# Patient Record
Sex: FEMALE | Race: WHITE | NOT HISPANIC OR LATINO
[De-identification: names, ages, dates, MRNs, and addresses within clinical notes are randomized per-mention and may not be internally consistent; named-entity substitution may affect disease eponyms.]

---

## 2018-02-12 ENCOUNTER — TRANSCRIPTION ENCOUNTER (OUTPATIENT)
Age: 38
End: 2018-02-12

## 2018-06-06 ENCOUNTER — TRANSCRIPTION ENCOUNTER (OUTPATIENT)
Age: 38
End: 2018-06-06

## 2018-06-11 ENCOUNTER — TRANSCRIPTION ENCOUNTER (OUTPATIENT)
Age: 38
End: 2018-06-11

## 2018-11-09 ENCOUNTER — TRANSCRIPTION ENCOUNTER (OUTPATIENT)
Age: 38
End: 2018-11-09

## 2019-03-24 ENCOUNTER — TRANSCRIPTION ENCOUNTER (OUTPATIENT)
Age: 39
End: 2019-03-24

## 2020-02-07 ENCOUNTER — TRANSCRIPTION ENCOUNTER (OUTPATIENT)
Age: 40
End: 2020-02-07

## 2020-12-15 ENCOUNTER — TRANSCRIPTION ENCOUNTER (OUTPATIENT)
Age: 40
End: 2020-12-15

## 2020-12-17 ENCOUNTER — TRANSCRIPTION ENCOUNTER (OUTPATIENT)
Age: 40
End: 2020-12-17

## 2020-12-17 ENCOUNTER — INPATIENT (INPATIENT)
Facility: HOSPITAL | Age: 40
LOS: 0 days | Discharge: HOME | DRG: 811 | End: 2020-12-18
Attending: HOSPITALIST | Admitting: HOSPITALIST
Payer: COMMERCIAL

## 2020-12-17 VITALS
WEIGHT: 113.1 LBS | OXYGEN SATURATION: 97 % | HEART RATE: 112 BPM | HEIGHT: 63 IN | RESPIRATION RATE: 20 BRPM | SYSTOLIC BLOOD PRESSURE: 128 MMHG | DIASTOLIC BLOOD PRESSURE: 55 MMHG | TEMPERATURE: 98 F

## 2020-12-17 DIAGNOSIS — Z90.49 ACQUIRED ABSENCE OF OTHER SPECIFIED PARTS OF DIGESTIVE TRACT: Chronic | ICD-10-CM

## 2020-12-17 DIAGNOSIS — Z98.89 OTHER SPECIFIED POSTPROCEDURAL STATES: Chronic | ICD-10-CM

## 2020-12-17 DIAGNOSIS — D57.00 HB-SS DISEASE WITH CRISIS, UNSPECIFIED: ICD-10-CM

## 2020-12-17 LAB
ALBUMIN SERPL ELPH-MCNC: 4.1 G/DL — SIGNIFICANT CHANGE UP (ref 3.3–5.2)
ALP SERPL-CCNC: 49 U/L — SIGNIFICANT CHANGE UP (ref 40–120)
ALT FLD-CCNC: 19 U/L — SIGNIFICANT CHANGE UP
ANION GAP SERPL CALC-SCNC: 14 MMOL/L — SIGNIFICANT CHANGE UP (ref 5–17)
ANISOCYTOSIS BLD QL: SLIGHT — SIGNIFICANT CHANGE UP
APTT BLD: 27.6 SEC — SIGNIFICANT CHANGE UP (ref 27.5–35.5)
AST SERPL-CCNC: 75 U/L — HIGH
B PERT DNA SPEC QL NAA+PROBE: SIGNIFICANT CHANGE UP
BASOPHILS # BLD AUTO: 0 K/UL — SIGNIFICANT CHANGE UP (ref 0–0.2)
BASOPHILS NFR BLD AUTO: 0 % — SIGNIFICANT CHANGE UP (ref 0–2)
BILIRUB SERPL-MCNC: 2.3 MG/DL — HIGH (ref 0.4–2)
BLD GP AB SCN SERPL QL: SIGNIFICANT CHANGE UP
BUN SERPL-MCNC: 7 MG/DL — LOW (ref 8–20)
BURR CELLS BLD QL SMEAR: SLIGHT — SIGNIFICANT CHANGE UP
C PNEUM DNA SPEC QL NAA+PROBE: SIGNIFICANT CHANGE UP
CALCIUM SERPL-MCNC: 8.7 MG/DL — SIGNIFICANT CHANGE UP (ref 8.6–10.2)
CHLORIDE SERPL-SCNC: 103 MMOL/L — SIGNIFICANT CHANGE UP (ref 98–107)
CO2 SERPL-SCNC: 21 MMOL/L — LOW (ref 22–29)
CREAT SERPL-MCNC: 0.24 MG/DL — LOW (ref 0.5–1.3)
DACRYOCYTES BLD QL SMEAR: SLIGHT — SIGNIFICANT CHANGE UP
ELLIPTOCYTES BLD QL SMEAR: SLIGHT — SIGNIFICANT CHANGE UP
EOSINOPHIL # BLD AUTO: 0 K/UL — SIGNIFICANT CHANGE UP (ref 0–0.5)
EOSINOPHIL NFR BLD AUTO: 0 % — SIGNIFICANT CHANGE UP (ref 0–6)
FLUAV H1 2009 PAND RNA SPEC QL NAA+PROBE: SIGNIFICANT CHANGE UP
FLUAV H1 RNA SPEC QL NAA+PROBE: SIGNIFICANT CHANGE UP
FLUAV H3 RNA SPEC QL NAA+PROBE: SIGNIFICANT CHANGE UP
FLUAV SUBTYP SPEC NAA+PROBE: SIGNIFICANT CHANGE UP
FLUBV RNA SPEC QL NAA+PROBE: SIGNIFICANT CHANGE UP
GIANT PLATELETS BLD QL SMEAR: PRESENT — SIGNIFICANT CHANGE UP
GLUCOSE SERPL-MCNC: 99 MG/DL — SIGNIFICANT CHANGE UP (ref 70–99)
HADV DNA SPEC QL NAA+PROBE: SIGNIFICANT CHANGE UP
HCG SERPL-ACNC: <4 MIU/ML — SIGNIFICANT CHANGE UP
HCOV PNL SPEC NAA+PROBE: SIGNIFICANT CHANGE UP
HCT VFR BLD CALC: 16.7 % — CRITICAL LOW (ref 34.5–45)
HGB BLD-MCNC: 5.4 G/DL — CRITICAL LOW (ref 11.5–15.5)
HMPV RNA SPEC QL NAA+PROBE: SIGNIFICANT CHANGE UP
HPIV1 RNA SPEC QL NAA+PROBE: SIGNIFICANT CHANGE UP
HPIV2 RNA SPEC QL NAA+PROBE: SIGNIFICANT CHANGE UP
HPIV3 RNA SPEC QL NAA+PROBE: SIGNIFICANT CHANGE UP
HPIV4 RNA SPEC QL NAA+PROBE: SIGNIFICANT CHANGE UP
HYPOCHROMIA BLD QL: SLIGHT — SIGNIFICANT CHANGE UP
INR BLD: 1.22 RATIO — HIGH (ref 0.88–1.16)
LYMPHOCYTES # BLD AUTO: 0.2 K/UL — LOW (ref 1–3.3)
LYMPHOCYTES # BLD AUTO: 4.4 % — LOW (ref 13–44)
MACROCYTES BLD QL: SLIGHT — SIGNIFICANT CHANGE UP
MANUAL SMEAR VERIFICATION: SIGNIFICANT CHANGE UP
MCHC RBC-ENTMCNC: 23.5 PG — LOW (ref 27–34)
MCHC RBC-ENTMCNC: 32.3 GM/DL — SIGNIFICANT CHANGE UP (ref 32–36)
MCV RBC AUTO: 72.6 FL — LOW (ref 80–100)
METAMYELOCYTES # FLD: 0.9 % — HIGH (ref 0–0)
MICROCYTES BLD QL: SLIGHT — SIGNIFICANT CHANGE UP
MONOCYTES # BLD AUTO: 0.43 K/UL — SIGNIFICANT CHANGE UP (ref 0–0.9)
MONOCYTES NFR BLD AUTO: 9.6 % — SIGNIFICANT CHANGE UP (ref 2–14)
MYELOCYTES NFR BLD: 0.9 % — HIGH (ref 0–0)
NEUTROPHILS # BLD AUTO: 3.74 K/UL — SIGNIFICANT CHANGE UP (ref 1.8–7.4)
NEUTROPHILS NFR BLD AUTO: 84.2 % — HIGH (ref 43–77)
NRBC # BLD: 2 /100 — HIGH (ref 0–0)
OVALOCYTES BLD QL SMEAR: SLIGHT — SIGNIFICANT CHANGE UP
PLAT MORPH BLD: NORMAL — SIGNIFICANT CHANGE UP
PLATELET # BLD AUTO: 57 K/UL — LOW (ref 150–400)
PLATELET COUNT - ESTIMATE: ABNORMAL
POIKILOCYTOSIS BLD QL AUTO: SLIGHT — SIGNIFICANT CHANGE UP
POLYCHROMASIA BLD QL SMEAR: SLIGHT — SIGNIFICANT CHANGE UP
POTASSIUM SERPL-MCNC: 4.1 MMOL/L — SIGNIFICANT CHANGE UP (ref 3.5–5.3)
POTASSIUM SERPL-SCNC: 4.1 MMOL/L — SIGNIFICANT CHANGE UP (ref 3.5–5.3)
PROT SERPL-MCNC: 6.9 G/DL — SIGNIFICANT CHANGE UP (ref 6.6–8.7)
PROTHROM AB SERPL-ACNC: 14 SEC — HIGH (ref 10.6–13.6)
RAPID RVP RESULT: SIGNIFICANT CHANGE UP
RBC # BLD: 2.3 M/UL — LOW (ref 3.8–5.2)
RBC # BLD: 2.3 M/UL — LOW (ref 3.8–5.2)
RBC # FLD: 18.9 % — HIGH (ref 10.3–14.5)
RBC BLD AUTO: NORMAL — SIGNIFICANT CHANGE UP
RETICS #: 125.8 K/UL — HIGH (ref 25–125)
RETICS/RBC NFR: 5.5 % — HIGH (ref 0.5–2.5)
RV+EV RNA SPEC QL NAA+PROBE: SIGNIFICANT CHANGE UP
SARS-COV-2 RNA SPEC QL NAA+PROBE: DETECTED
SODIUM SERPL-SCNC: 138 MMOL/L — SIGNIFICANT CHANGE UP (ref 135–145)
WBC # BLD: 4.44 K/UL — SIGNIFICANT CHANGE UP (ref 3.8–10.5)
WBC # FLD AUTO: 4.44 K/UL — SIGNIFICANT CHANGE UP (ref 3.8–10.5)

## 2020-12-17 PROCEDURE — 99285 EMERGENCY DEPT VISIT HI MDM: CPT

## 2020-12-17 PROCEDURE — 99223 1ST HOSP IP/OBS HIGH 75: CPT

## 2020-12-17 PROCEDURE — 71045 X-RAY EXAM CHEST 1 VIEW: CPT | Mod: 26

## 2020-12-17 RX ORDER — ACETAMINOPHEN 500 MG
650 TABLET ORAL EVERY 6 HOURS
Refills: 0 | Status: DISCONTINUED | OUTPATIENT
Start: 2020-12-17 | End: 2020-12-18

## 2020-12-17 RX ORDER — MORPHINE SULFATE 50 MG/1
2 CAPSULE, EXTENDED RELEASE ORAL ONCE
Refills: 0 | Status: DISCONTINUED | OUTPATIENT
Start: 2020-12-17 | End: 2020-12-17

## 2020-12-17 RX ORDER — ALPRAZOLAM 0.25 MG
0.25 TABLET ORAL ONCE
Refills: 0 | Status: DISCONTINUED | OUTPATIENT
Start: 2020-12-17 | End: 2020-12-17

## 2020-12-17 RX ORDER — FOLIC ACID 0.8 MG
1 TABLET ORAL
Qty: 0 | Refills: 0 | DISCHARGE
Start: 2020-12-17

## 2020-12-17 RX ORDER — FOLIC ACID 0.8 MG
1 TABLET ORAL DAILY
Refills: 0 | Status: DISCONTINUED | OUTPATIENT
Start: 2020-12-17 | End: 2020-12-18

## 2020-12-17 RX ORDER — ALPRAZOLAM 0.25 MG
1 TABLET ORAL ONCE
Refills: 0 | Status: DISCONTINUED | OUTPATIENT
Start: 2020-12-17 | End: 2020-12-17

## 2020-12-17 RX ORDER — SODIUM CHLORIDE 9 MG/ML
1000 INJECTION INTRAMUSCULAR; INTRAVENOUS; SUBCUTANEOUS ONCE
Refills: 0 | Status: COMPLETED | OUTPATIENT
Start: 2020-12-17 | End: 2020-12-17

## 2020-12-17 RX ADMIN — Medication 650 MILLIGRAM(S): at 23:49

## 2020-12-17 RX ADMIN — Medication 0.25 MILLIGRAM(S): at 23:49

## 2020-12-17 RX ADMIN — Medication 1 MILLIGRAM(S): at 13:30

## 2020-12-17 RX ADMIN — SODIUM CHLORIDE 1000 MILLILITER(S): 9 INJECTION INTRAMUSCULAR; INTRAVENOUS; SUBCUTANEOUS at 12:20

## 2020-12-17 NOTE — DISCHARGE NOTE PROVIDER - HOSPITAL COURSE
40 yof with pmh of sickle cell with baseline hemoglobin of 7.5-9 presents from home with progressive sob. Patient states she was recently in [facility] with sob and found to have covid but was dicharged home with a hemoglobin of approx 8. patient was at home and got more sob. Patient is normally very active. patient presented and found to have a hemoglobin under 6. patient tramsfused 2 units and told to follow up th hematology next week    patient of note was positive for covid but is not hypoxic. patient advised to follow up with primary care  of note I spoke to patients hematologist and agreed with 2 units    time spent on dc 34 minutes    PHYSICAL EXAM:    GENERAL: NAD, well-groomed, well-developed  HEAD:  Atraumatic, Normocephalic  EYES: EOMI, PERRLA, conjunctiva and sclera clear  ENMT: No tonsillar erythema, exudates, or enlargement; Moist mucous membranes, Good dentition, No lesions  NECK: Supple, No JVD, Normal thyroid  NERVOUS SYSTEM:  Alert & Oriented X3, Good concentration; Motor Strength 5/5 B/L upper and lower extremities; DTRs 2+ intact and symmetric  CHEST/LUNG: Clear to percussion bilaterally; No rales, rhonchi, wheezing, or rubs  HEART: Regular rate and rhythm; No murmurs, rubs, or gallops  ABDOMEN: Soft, Nontender, Nondistended; Bowel sounds present  EXTREMITIES:  2+ Peripheral Pulses, No clubbing, cyanosis, or edema  LYMPH: No lymphadenopathy noted  SKIN: No rashes or lesions   40 yof with pmh of sickle cell with baseline hemoglobin of 7.5-9 presents from home with progressive sob. Patient states she was recently in [facility] with sob and found to have covid but was dicharged home with a hemoglobin of approx 8. patient was at home and got more sob. Patient is normally very active. patient presented and found to have a hemoglobin under 6. patient tramsfused 2 units and told to follow up th hematology next week    patient of note was positive for covid but is not hypoxic. patient advised to follow up with primary care  of note I spoke to patients hematologist and agreed with 2 units    patient aslo had a fever but neg ua, no leucocytosis and nornal chest xray  --> [place] from covid, advised to self quarantine    time spent on dc 34 minutes    PHYSICAL EXAM:    GENERAL: NAD, well-groomed, well-developed  HEAD:  Atraumatic, Normocephalic  EYES: EOMI, PERRLA, conjunctiva and sclera clear  ENMT: No tonsillar erythema, exudates, or enlargement; Moist mucous membranes, Good dentition, No lesions  NECK: Supple, No JVD, Normal thyroid  NERVOUS SYSTEM:  Alert & Oriented X3, Good concentration; Motor Strength 5/5 B/L upper and lower extremities; DTRs 2+ intact and symmetric  CHEST/LUNG: Clear to percussion bilaterally; No rales, rhonchi, wheezing, or rubs  HEART: Regular rate and rhythm; No murmurs, rubs, or gallops  ABDOMEN: Soft, Nontender, Nondistended; Bowel sounds present  EXTREMITIES:  2+ Peripheral Pulses, No clubbing, cyanosis, or edema  LYMPH: No lymphadenopathy noted  SKIN: No rashes or lesions

## 2020-12-17 NOTE — ED ADULT NURSE REASSESSMENT NOTE - NS ED NURSE REASSESS COMMENT FT1
Blood infusing through RAC.  Consent on chart.  Risks and benefits explained to patient as well as s/s transfusion reaction.  Pt is aware to notify RN of any unusual symptoms.  Second RN at bedside for confirmation.  Pt had a temp of 101.8 prior to transfusion and Tylenol was given as ordered.  Pt's temp 102.1 15 minutes into transfusion, pt denies any chest pain, back pain, SOB or any resp distress.  No rash or itching noted.  Dr Echavarria aware.

## 2020-12-17 NOTE — ED PROVIDER NOTE - CLINICAL SUMMARY MEDICAL DECISION MAKING FREE TEXT BOX
patient w/ sickle cell presenting with weakness/fatigue/SOB similar to low Hgb in past. also with COVID which could be cause of symptoms. will check labs w/ retic count/type. CXR. repeat covid. hydration reasses

## 2020-12-17 NOTE — ED ADULT TRIAGE NOTE - CHIEF COMPLAINT QUOTE
pt A&Ox 4 BIBA c/o generalized weakness since this AM and states "Ama had many blood transfusions and I feel like I need one." Pt denies any other symptoms at this time. Tested covid + 2 weeks ago at [facility]

## 2020-12-17 NOTE — DISCHARGE NOTE PROVIDER - CARE PROVIDER_API CALL
Oziel Zepeda)  Medical Oncology  [address]  [place] [address]  Phone: [phone]  Fax: [phone]  Follow Up Time:

## 2020-12-17 NOTE — ED PROVIDER NOTE - NS ED ROS FT
ROS: no CP +SOB. no cough. no fever. no n/v/c. +diarrhea no abd pain. no rash. no bleeding. no urinary complaints. +weakness. no vision changes. no HA. no neck/back pain. no extremity swelling/deformity. No change in mental status.

## 2020-12-17 NOTE — ED ADULT NURSE NOTE - NSIMPLEMENTINTERV_GEN_ALL_ED
Implemented All Universal Safety Interventions:  [place] to call system. Call bell, personal items and telephone within reach. Instruct patient to call for assistance. Room bathroom lighting operational. Non-slip footwear when patient is off stretcher. Physically safe environment: no spills, clutter or unnecessary equipment. Stretcher in lowest position, wheels locked, appropriate side rails in place.

## 2020-12-17 NOTE — H&P ADULT - NSHPLABSRESULTS_GEN_ALL_CORE
5.4    4.44   )----------(  57        ( 17 Dec 2020 12:36 )               16.7      138    |  103    |  7.0    ----------------------------<  99         ( 17 Dec 2020 12:36 )  4.1     |  21.0   |  0.24     Ca    8.7        ( 17 Dec 2020 12:36 )    TPro  6.9    /  Alb  4.1    /  TBili  2.3    /  DBili  x      /  AST  75     /  ALT  19     /  AlkPhos  49     ( 17 Dec 2020 12:36 )    LIVER FUNCTIONS - ( 17 Dec 2020 12:36 )  Alb: 4.1 g/dL / Pro: 6.9 g/dL / ALK PHOS: 49 U/L / ALT: 19 U/L / AST: 75 U/L / GGT: x           PT/INR -  14.0 sec / 1.22 ratio   ( 17 Dec 2020 12:36 )       PTT -  27.6 sec   ( 17 Dec 2020 12:36 )  CAPILLARY BLOOD GLUCOSE        Urinalysis Basic - ( 18 Dec 2020 01:03 )    Color: Yellow / Appearance: Clear / S.005 / pH: x  Gluc: x / Ketone: Negative  / Bili: Negative / Urobili: Negative mg/dL   Blood: x / Protein: Negative mg/dL / Nitrite: Negative   Leuk Esterase: Negative / RBC: Negative /HPF / WBC 3-5   Sq Epi: x / Non Sq Epi: Occasional / Bacteria: Occasional      chest xray - normal

## 2020-12-17 NOTE — H&P ADULT - ASSESSMENT
#symptomatic anemia - will transfuse 2 units prbcs  --> spoke to Dr Boston , agrees with 2 units  - send cbc in am, and iron studies    #sickle cell - outpatient onc follow up    #fever - no wbc count, normal xray , normal ua  --> lik[place] from covid

## 2020-12-17 NOTE — DISCHARGE NOTE PROVIDER - NSDCCPCAREPLAN_GEN_ALL_CORE_FT
PRINCIPAL DISCHARGE DIAGNOSIS  Diagnosis: Sickle cell crisis  Assessment and Plan of Treatment:       SECONDARY DISCHARGE DIAGNOSES  Diagnosis: COVID-19  Assessment and Plan of Treatment:

## 2020-12-17 NOTE — ED ADULT NURSE REASSESSMENT NOTE - NS ED NURSE REASSESS COMMENT FT1
Report to Andreea on 3T. Pt transferred to floor with transport and RN. Second unit of blood infusing without s/s reaction noted.

## 2020-12-17 NOTE — ED ADULT NURSE NOTE - OBJECTIVE STATEMENT
pt A&Ox 4 BIBA c/o generalized weakness since this AM pt has  hx of Sickle cell disease  and numerous blood transfusions in the past states she feels like she needs a transfusion.  P/s she tested positive for covid 2 weeks ago at [facility].  Denies any other symptoms.

## 2020-12-17 NOTE — H&P ADULT - HISTORY OF PRESENT ILLNESS
Patient is a 40 yof with pmh of sickle cell with baseline hemoglobin of 7.5-9 presents from home with progressive sob. Patient states she was recently in [facility] with sob and found to have covid but was dicharged home with a hemoglobin of approx 8. patient was at home and got more sob. Patient is normally very active. patient presented and found to have a hemoglobin under 6. patient being admitted for transfusion

## 2020-12-17 NOTE — ED PROVIDER NOTE - PHYSICAL EXAMINATION
Gen: NAD, AOx3  Head: NCAT  HEENT: EOMI, oral mucosa moist, normal conjunctiva, neck supple  Lung: CTAB, no respiratory distress  CV: tachy regular, no murmur, Normal perfusion  Abd: soft, NTND  MSK: No edema, no visible deformities  Neuro: No focal neurologic deficits  Skin: No rash   Psych: normal affect

## 2020-12-17 NOTE — H&P ADULT - NSHPREVIEWOFSYSTEMS_GEN_ALL_CORE
REVIEW OF SYSTEMS:    CONSTITUTIONAL: fatigue  EYES: No eye pain, visual disturbances, or discharge  ENMT:  No difficulty hearing, tinnitus, vertigo; No sinus or throat pain  NECK: No pain or stiffness  BREASTS: No pain, masses, or nipple discharge  RESPIRATORY: sob   CARDIOVASCULAR: No chest pain, palpitations, dizziness, or leg swelling  GASTROINTESTINAL: No abdominal or epigastric pain. No nausea, vomiting, or hematemesis; No diarrhea or constipation. No melena or hematochezia.  GENITOURINARY: No dysuria, frequency, hematuria, or incontinence  NEUROLOGICAL: No headaches, memory loss, loss of strength, numbness, or tremors  SKIN: No itching, burning, rashes, or lesions   LYMPH NODES: No enlarged glands  ENDOCRINE: No heat or cold intolerance; No hair loss  MUSCULOSKELETAL: No joint pain or swelling; No muscle, back, or extremity pain  PSYCHIATRIC: No depression, anxiety, mood swings, or difficulty sleeping  HEME/LYMPH: No easy bruising, or bleeding gums  ALLERY AND IMMUNOLOGIC: No hives or eczema

## 2020-12-18 ENCOUNTER — TRANSCRIPTION ENCOUNTER (OUTPATIENT)
Age: 40
End: 2020-12-18

## 2020-12-18 VITALS
DIASTOLIC BLOOD PRESSURE: 72 MMHG | SYSTOLIC BLOOD PRESSURE: 100 MMHG | RESPIRATION RATE: 18 BRPM | TEMPERATURE: 99 F | HEART RATE: 78 BPM | OXYGEN SATURATION: 99 %

## 2020-12-18 LAB
ALBUMIN SERPL ELPH-MCNC: 3.8 G/DL — SIGNIFICANT CHANGE UP (ref 3.3–5.2)
ALP SERPL-CCNC: 43 U/L — SIGNIFICANT CHANGE UP (ref 40–120)
ALT FLD-CCNC: 15 U/L — SIGNIFICANT CHANGE UP
ANION GAP SERPL CALC-SCNC: 9 MMOL/L — SIGNIFICANT CHANGE UP (ref 5–17)
APPEARANCE UR: CLEAR — SIGNIFICANT CHANGE UP
AST SERPL-CCNC: 53 U/L — HIGH
BACTERIA # UR AUTO: ABNORMAL
BASOPHILS # BLD AUTO: 0 K/UL — SIGNIFICANT CHANGE UP (ref 0–0.2)
BASOPHILS NFR BLD AUTO: 0 % — SIGNIFICANT CHANGE UP (ref 0–2)
BILIRUB SERPL-MCNC: 2.1 MG/DL — HIGH (ref 0.4–2)
BILIRUB UR-MCNC: NEGATIVE — SIGNIFICANT CHANGE UP
BUN SERPL-MCNC: 8 MG/DL — SIGNIFICANT CHANGE UP (ref 8–20)
CALCIUM SERPL-MCNC: 8.4 MG/DL — LOW (ref 8.6–10.2)
CHLORIDE SERPL-SCNC: 103 MMOL/L — SIGNIFICANT CHANGE UP (ref 98–107)
CO2 SERPL-SCNC: 23 MMOL/L — SIGNIFICANT CHANGE UP (ref 22–29)
COLOR SPEC: YELLOW — SIGNIFICANT CHANGE UP
CREAT SERPL-MCNC: 0.39 MG/DL — LOW (ref 0.5–1.3)
DIFF PNL FLD: ABNORMAL
EOSINOPHIL # BLD AUTO: 0.04 K/UL — SIGNIFICANT CHANGE UP (ref 0–0.5)
EOSINOPHIL NFR BLD AUTO: 0.9 % — SIGNIFICANT CHANGE UP (ref 0–6)
EPI CELLS # UR: SIGNIFICANT CHANGE UP
FERRITIN SERPL-MCNC: 964 NG/ML — HIGH (ref 15–150)
GLUCOSE SERPL-MCNC: 112 MG/DL — HIGH (ref 70–99)
GLUCOSE UR QL: NEGATIVE MG/DL — SIGNIFICANT CHANGE UP
HCT VFR BLD CALC: 24.3 % — LOW (ref 34.5–45)
HGB BLD-MCNC: 7.8 G/DL — LOW (ref 11.5–15.5)
IRON SATN MFR SERPL: 11 % — LOW (ref 14–50)
IRON SATN MFR SERPL: 25 UG/DL — LOW (ref 37–145)
KETONES UR-MCNC: NEGATIVE — SIGNIFICANT CHANGE UP
LEUKOCYTE ESTERASE UR-ACNC: NEGATIVE — SIGNIFICANT CHANGE UP
LYMPHOCYTES # BLD AUTO: 0.65 K/UL — LOW (ref 1–3.3)
LYMPHOCYTES # BLD AUTO: 15 % — SIGNIFICANT CHANGE UP (ref 13–44)
MAGNESIUM SERPL-MCNC: 1.9 MG/DL — SIGNIFICANT CHANGE UP (ref 1.6–2.6)
MCHC RBC-ENTMCNC: 24.3 PG — LOW (ref 27–34)
MCHC RBC-ENTMCNC: 32.1 GM/DL — SIGNIFICANT CHANGE UP (ref 32–36)
MCV RBC AUTO: 75.7 FL — LOW (ref 80–100)
METAMYELOCYTES # FLD: 1.8 % — HIGH (ref 0–0)
MONOCYTES # BLD AUTO: 0.31 K/UL — SIGNIFICANT CHANGE UP (ref 0–0.9)
MONOCYTES NFR BLD AUTO: 7.1 % — SIGNIFICANT CHANGE UP (ref 2–14)
MYELOCYTES NFR BLD: 0.9 % — HIGH (ref 0–0)
NEUTROPHILS # BLD AUTO: 3.11 K/UL — SIGNIFICANT CHANGE UP (ref 1.8–7.4)
NEUTROPHILS NFR BLD AUTO: 69 % — SIGNIFICANT CHANGE UP (ref 43–77)
NEUTS BAND # BLD: 2.6 % — SIGNIFICANT CHANGE UP (ref 0–8)
NITRITE UR-MCNC: NEGATIVE — SIGNIFICANT CHANGE UP
NRBC # BLD: 6 /100 — HIGH (ref 0–0)
PH UR: 7 — SIGNIFICANT CHANGE UP (ref 5–8)
PLAT MORPH BLD: NORMAL — SIGNIFICANT CHANGE UP
PLATELET # BLD AUTO: 67 K/UL — LOW (ref 150–400)
POTASSIUM SERPL-MCNC: 3.8 MMOL/L — SIGNIFICANT CHANGE UP (ref 3.5–5.3)
POTASSIUM SERPL-SCNC: 3.8 MMOL/L — SIGNIFICANT CHANGE UP (ref 3.5–5.3)
PROMYELOCYTES # FLD: 0.9 % — HIGH (ref 0–0)
PROT SERPL-MCNC: 6.8 G/DL — SIGNIFICANT CHANGE UP (ref 6.6–8.7)
PROT UR-MCNC: NEGATIVE MG/DL — SIGNIFICANT CHANGE UP
RBC # BLD: 3.21 M/UL — LOW (ref 3.8–5.2)
RBC # BLD: 3.21 M/UL — LOW (ref 3.8–5.2)
RBC # FLD: 20 % — HIGH (ref 10.3–14.5)
RBC BLD AUTO: ABNORMAL
RBC CASTS # UR COMP ASSIST: NEGATIVE /HPF — SIGNIFICANT CHANGE UP (ref 0–4)
RETICS #: 97.3 K/UL — SIGNIFICANT CHANGE UP (ref 25–125)
RETICS/RBC NFR: 3.1 % — HIGH (ref 0.5–2.5)
SODIUM SERPL-SCNC: 135 MMOL/L — SIGNIFICANT CHANGE UP (ref 135–145)
SP GR SPEC: 1 — LOW (ref 1.01–1.02)
TIBC SERPL-MCNC: 229 UG/DL — SIGNIFICANT CHANGE UP (ref 220–430)
TRANSFERRIN SERPL-MCNC: 160 MG/DL — LOW (ref 192–382)
UROBILINOGEN FLD QL: NEGATIVE MG/DL — SIGNIFICANT CHANGE UP
VARIANT LYMPHS # BLD: 1.8 % — SIGNIFICANT CHANGE UP (ref 0–6)
WBC # BLD: 4.35 K/UL — SIGNIFICANT CHANGE UP (ref 3.8–10.5)
WBC # FLD AUTO: 4.35 K/UL — SIGNIFICANT CHANGE UP (ref 3.8–10.5)
WBC UR QL: SIGNIFICANT CHANGE UP

## 2020-12-18 PROCEDURE — 36415 COLL VENOUS BLD VENIPUNCTURE: CPT

## 2020-12-18 PROCEDURE — 86769 SARS-COV-2 COVID-19 ANTIBODY: CPT

## 2020-12-18 PROCEDURE — 0225U NFCT DS DNA&RNA 21 SARSCOV2: CPT

## 2020-12-18 PROCEDURE — 99285 EMERGENCY DEPT VISIT HI MDM: CPT

## 2020-12-18 PROCEDURE — 71045 X-RAY EXAM CHEST 1 VIEW: CPT

## 2020-12-18 PROCEDURE — 85610 PROTHROMBIN TIME: CPT

## 2020-12-18 PROCEDURE — 86900 BLOOD TYPING SEROLOGIC ABO: CPT

## 2020-12-18 PROCEDURE — 82728 ASSAY OF FERRITIN: CPT

## 2020-12-18 PROCEDURE — 93010 ELECTROCARDIOGRAM REPORT: CPT

## 2020-12-18 PROCEDURE — 84702 CHORIONIC GONADOTROPIN TEST: CPT

## 2020-12-18 PROCEDURE — 86923 COMPATIBILITY TEST ELECTRIC: CPT

## 2020-12-18 PROCEDURE — 83540 ASSAY OF IRON: CPT

## 2020-12-18 PROCEDURE — 99239 HOSP IP/OBS DSCHRG MGMT >30: CPT

## 2020-12-18 PROCEDURE — P9016: CPT

## 2020-12-18 PROCEDURE — 93005 ELECTROCARDIOGRAM TRACING: CPT

## 2020-12-18 PROCEDURE — 84466 ASSAY OF TRANSFERRIN: CPT

## 2020-12-18 PROCEDURE — 83550 IRON BINDING TEST: CPT

## 2020-12-18 PROCEDURE — 80053 COMPREHEN METABOLIC PANEL: CPT

## 2020-12-18 PROCEDURE — 81001 URINALYSIS AUTO W/SCOPE: CPT

## 2020-12-18 PROCEDURE — 85045 AUTOMATED RETICULOCYTE COUNT: CPT

## 2020-12-18 PROCEDURE — 36430 TRANSFUSION BLD/BLD COMPNT: CPT

## 2020-12-18 PROCEDURE — 86850 RBC ANTIBODY SCREEN: CPT

## 2020-12-18 PROCEDURE — 85025 COMPLETE CBC W/AUTO DIFF WBC: CPT

## 2020-12-18 PROCEDURE — 86901 BLOOD TYPING SEROLOGIC RH(D): CPT

## 2020-12-18 PROCEDURE — 85730 THROMBOPLASTIN TIME PARTIAL: CPT

## 2020-12-18 PROCEDURE — 83735 ASSAY OF MAGNESIUM: CPT

## 2020-12-18 RX ORDER — ONDANSETRON 8 MG/1
4 TABLET, FILM COATED ORAL ONCE
Refills: 0 | Status: COMPLETED | OUTPATIENT
Start: 2020-12-18 | End: 2020-12-18

## 2020-12-18 RX ORDER — ALPRAZOLAM 0.25 MG
0.25 TABLET ORAL ONCE
Refills: 0 | Status: DISCONTINUED | OUTPATIENT
Start: 2020-12-18 | End: 2020-12-18

## 2020-12-18 RX ADMIN — Medication 0.25 MILLIGRAM(S): at 00:50

## 2020-12-18 RX ADMIN — Medication 1 TABLET(S): at 12:50

## 2020-12-18 RX ADMIN — Medication 1 MILLIGRAM(S): at 12:50

## 2020-12-18 RX ADMIN — MORPHINE SULFATE 2 MILLIGRAM(S): 50 CAPSULE, EXTENDED RELEASE ORAL at 02:45

## 2020-12-18 RX ADMIN — ONDANSETRON 4 MILLIGRAM(S): 8 TABLET, FILM COATED ORAL at 00:57

## 2020-12-18 RX ADMIN — Medication 650 MILLIGRAM(S): at 00:50

## 2020-12-18 RX ADMIN — MORPHINE SULFATE 2 MILLIGRAM(S): 50 CAPSULE, EXTENDED RELEASE ORAL at 02:12

## 2020-12-18 NOTE — DISCHARGE NOTE NURSING/CASE MANAGEMENT/SOCIAL WORK - PATIENT PORTAL LINK FT
You can access the FollowMyHealth Patient Portal offered by [facility] by registering at the following website: [email or web address]. By joining [facility]’s FollowMyHealth portal, you will also be able to view your health information using other applications (apps) compatible with our system.

## 2020-12-18 NOTE — CHART NOTE - NSCHARTNOTEFT_GEN_A_CORE
Medicine PA-   Cd initially for pt with c/o joint pain, requesting  toradol. Pt admitted w/ sc anemia and covid +,  hmglb- 5.4, x2 units PRBCs ordered and completed. Pt has been very anxious, googling medical info, worried about transfusion reaction. Pt has had multiple transfusions in past without incident, no sxs of allergic reaction now, only c/o nausea, no vomiting and joint pain. VSS- /72-.9-96%RA.  Didn't order toradol since pt's plts low- 57, MS 2mg IVP ordered (pt sates she's taken that before w/ no problem despite ?dilaudid intolerance) Also ordered xanax 0.25mg and zofran 4mg IVP, reassurance given, continue to monitor, call PA if status changes.

## 2020-12-19 ENCOUNTER — TRANSCRIPTION ENCOUNTER (OUTPATIENT)
Age: 40
End: 2020-12-19

## 2020-12-19 LAB
SARS-COV-2 IGG SERPL QL IA: POSITIVE
SARS-COV-2 IGM SERPL IA-ACNC: 4.46 INDEX — HIGH

## 2020-12-23 ENCOUNTER — TRANSCRIPTION ENCOUNTER (OUTPATIENT)
Age: 40
End: 2020-12-23

## 2020-12-28 ENCOUNTER — TRANSCRIPTION ENCOUNTER (OUTPATIENT)
Age: 40
End: 2020-12-28

## 2021-03-27 ENCOUNTER — TRANSCRIPTION ENCOUNTER (OUTPATIENT)
Age: 41
End: 2021-03-27

## 2021-08-13 ENCOUNTER — EMERGENCY (EMERGENCY)
Facility: HOSPITAL | Age: 41
LOS: 1 days | Discharge: HOME | End: 2021-08-13
Attending: EMERGENCY MEDICINE
Payer: COMMERCIAL

## 2021-08-13 VITALS
HEIGHT: 63 IN | OXYGEN SATURATION: 99 % | RESPIRATION RATE: 18 BRPM | HEART RATE: 90 BPM | WEIGHT: 134.92 LBS | SYSTOLIC BLOOD PRESSURE: 128 MMHG | DIASTOLIC BLOOD PRESSURE: 87 MMHG | TEMPERATURE: 99 F

## 2021-08-13 VITALS
SYSTOLIC BLOOD PRESSURE: 100 MMHG | TEMPERATURE: 99 F | RESPIRATION RATE: 18 BRPM | OXYGEN SATURATION: 98 % | HEART RATE: 81 BPM | DIASTOLIC BLOOD PRESSURE: 67 MMHG

## 2021-08-13 DIAGNOSIS — Z98.89 OTHER SPECIFIED POSTPROCEDURAL STATES: Chronic | ICD-10-CM

## 2021-08-13 DIAGNOSIS — Z90.49 ACQUIRED ABSENCE OF OTHER SPECIFIED PARTS OF DIGESTIVE TRACT: Chronic | ICD-10-CM

## 2021-08-13 LAB
ALBUMIN SERPL ELPH-MCNC: 4.2 G/DL — SIGNIFICANT CHANGE UP (ref 3.3–5.2)
ALP SERPL-CCNC: 56 U/L — SIGNIFICANT CHANGE UP (ref 40–120)
ALT FLD-CCNC: 32 U/L — SIGNIFICANT CHANGE UP
ANION GAP SERPL CALC-SCNC: 12 MMOL/L — SIGNIFICANT CHANGE UP (ref 5–17)
ANISOCYTOSIS BLD QL: SLIGHT — SIGNIFICANT CHANGE UP
ANISOCYTOSIS BLD QL: SLIGHT — SIGNIFICANT CHANGE UP
APPEARANCE UR: CLEAR — SIGNIFICANT CHANGE UP
AST SERPL-CCNC: 65 U/L — HIGH
BASO STIPL BLD QL SMEAR: PRESENT — SIGNIFICANT CHANGE UP
BASOPHILS # BLD AUTO: 0 K/UL — SIGNIFICANT CHANGE UP (ref 0–0.2)
BASOPHILS # BLD AUTO: 0 K/UL — SIGNIFICANT CHANGE UP (ref 0–0.2)
BASOPHILS NFR BLD AUTO: 0 % — SIGNIFICANT CHANGE UP (ref 0–2)
BASOPHILS NFR BLD AUTO: 0 % — SIGNIFICANT CHANGE UP (ref 0–2)
BILIRUB SERPL-MCNC: 2.1 MG/DL — HIGH (ref 0.4–2)
BILIRUB UR-MCNC: NEGATIVE — SIGNIFICANT CHANGE UP
BLD GP AB SCN SERPL QL: SIGNIFICANT CHANGE UP
BUN SERPL-MCNC: 7.2 MG/DL — LOW (ref 8–20)
CALCIUM SERPL-MCNC: 8.5 MG/DL — LOW (ref 8.6–10.2)
CHLORIDE SERPL-SCNC: 104 MMOL/L — SIGNIFICANT CHANGE UP (ref 98–107)
CO2 SERPL-SCNC: 23 MMOL/L — SIGNIFICANT CHANGE UP (ref 22–29)
COLOR SPEC: YELLOW — SIGNIFICANT CHANGE UP
CREAT SERPL-MCNC: 0.37 MG/DL — LOW (ref 0.5–1.3)
DACRYOCYTES BLD QL SMEAR: SIGNIFICANT CHANGE UP
DACRYOCYTES BLD QL SMEAR: SLIGHT — SIGNIFICANT CHANGE UP
DIFF PNL FLD: NEGATIVE — SIGNIFICANT CHANGE UP
ELLIPTOCYTES BLD QL SMEAR: SLIGHT — SIGNIFICANT CHANGE UP
EOSINOPHIL # BLD AUTO: 0 K/UL — SIGNIFICANT CHANGE UP (ref 0–0.5)
EOSINOPHIL # BLD AUTO: 0.09 K/UL — SIGNIFICANT CHANGE UP (ref 0–0.5)
EOSINOPHIL NFR BLD AUTO: 0 % — SIGNIFICANT CHANGE UP (ref 0–6)
EOSINOPHIL NFR BLD AUTO: 2.6 % — SIGNIFICANT CHANGE UP (ref 0–6)
GIANT PLATELETS BLD QL SMEAR: PRESENT — SIGNIFICANT CHANGE UP
GIANT PLATELETS BLD QL SMEAR: PRESENT — SIGNIFICANT CHANGE UP
GLUCOSE SERPL-MCNC: 98 MG/DL — SIGNIFICANT CHANGE UP (ref 70–99)
GLUCOSE UR QL: NEGATIVE MG/DL — SIGNIFICANT CHANGE UP
HCG SERPL-ACNC: <4 MIU/ML — SIGNIFICANT CHANGE UP
HCT VFR BLD CALC: 17.9 % — CRITICAL LOW (ref 34.5–45)
HCT VFR BLD CALC: 21.1 % — LOW (ref 34.5–45)
HGB BLD-MCNC: 6 G/DL — CRITICAL LOW (ref 11.5–15.5)
HGB BLD-MCNC: 7.1 G/DL — LOW (ref 11.5–15.5)
HYPOCHROMIA BLD QL: SLIGHT — SIGNIFICANT CHANGE UP
KETONES UR-MCNC: NEGATIVE — SIGNIFICANT CHANGE UP
LEUKOCYTE ESTERASE UR-ACNC: NEGATIVE — SIGNIFICANT CHANGE UP
LYMPHOCYTES # BLD AUTO: 0.49 K/UL — LOW (ref 1–3.3)
LYMPHOCYTES # BLD AUTO: 0.85 K/UL — LOW (ref 1–3.3)
LYMPHOCYTES # BLD AUTO: 13.8 % — SIGNIFICANT CHANGE UP (ref 13–44)
LYMPHOCYTES # BLD AUTO: 17 % — SIGNIFICANT CHANGE UP (ref 13–44)
MANUAL SMEAR VERIFICATION: SIGNIFICANT CHANGE UP
MANUAL SMEAR VERIFICATION: SIGNIFICANT CHANGE UP
MCHC RBC-ENTMCNC: 24.2 PG — LOW (ref 27–34)
MCHC RBC-ENTMCNC: 24.3 PG — LOW (ref 27–34)
MCHC RBC-ENTMCNC: 33.5 GM/DL — SIGNIFICANT CHANGE UP (ref 32–36)
MCHC RBC-ENTMCNC: 33.6 GM/DL — SIGNIFICANT CHANGE UP (ref 32–36)
MCV RBC AUTO: 72 FL — LOW (ref 80–100)
MCV RBC AUTO: 72.5 FL — LOW (ref 80–100)
MICROCYTES BLD QL: SLIGHT — SIGNIFICANT CHANGE UP
MONOCYTES # BLD AUTO: 0.14 K/UL — SIGNIFICANT CHANGE UP (ref 0–0.9)
MONOCYTES # BLD AUTO: 0.21 K/UL — SIGNIFICANT CHANGE UP (ref 0–0.9)
MONOCYTES NFR BLD AUTO: 2.8 % — SIGNIFICANT CHANGE UP (ref 2–14)
MONOCYTES NFR BLD AUTO: 6 % — SIGNIFICANT CHANGE UP (ref 2–14)
MYELOCYTES NFR BLD: 2.8 % — HIGH (ref 0–0)
NEUTROPHILS # BLD AUTO: 2.69 K/UL — SIGNIFICANT CHANGE UP (ref 1.8–7.4)
NEUTROPHILS # BLD AUTO: 3.77 K/UL — SIGNIFICANT CHANGE UP (ref 1.8–7.4)
NEUTROPHILS NFR BLD AUTO: 74.2 % — SIGNIFICANT CHANGE UP (ref 43–77)
NEUTROPHILS NFR BLD AUTO: 75.5 % — SIGNIFICANT CHANGE UP (ref 43–77)
NEUTS BAND # BLD: 1.7 % — SIGNIFICANT CHANGE UP (ref 0–8)
NITRITE UR-MCNC: NEGATIVE — SIGNIFICANT CHANGE UP
OVALOCYTES BLD QL SMEAR: SIGNIFICANT CHANGE UP
OVALOCYTES BLD QL SMEAR: SLIGHT — SIGNIFICANT CHANGE UP
PH UR: 7 — SIGNIFICANT CHANGE UP (ref 5–8)
PLAT MORPH BLD: NORMAL — SIGNIFICANT CHANGE UP
PLAT MORPH BLD: NORMAL — SIGNIFICANT CHANGE UP
PLATELET # BLD AUTO: 64 K/UL — LOW (ref 150–400)
PLATELET # BLD AUTO: 69 K/UL — LOW (ref 150–400)
POIKILOCYTOSIS BLD QL AUTO: SIGNIFICANT CHANGE UP
POIKILOCYTOSIS BLD QL AUTO: SIGNIFICANT CHANGE UP
POLYCHROMASIA BLD QL SMEAR: SLIGHT — SIGNIFICANT CHANGE UP
POLYCHROMASIA BLD QL SMEAR: SLIGHT — SIGNIFICANT CHANGE UP
POTASSIUM SERPL-MCNC: 3.9 MMOL/L — SIGNIFICANT CHANGE UP (ref 3.5–5.3)
POTASSIUM SERPL-SCNC: 3.9 MMOL/L — SIGNIFICANT CHANGE UP (ref 3.5–5.3)
PROT SERPL-MCNC: 6.9 G/DL — SIGNIFICANT CHANGE UP (ref 6.6–8.7)
PROT UR-MCNC: NEGATIVE MG/DL — SIGNIFICANT CHANGE UP
RBC # BLD: 2.47 M/UL — LOW (ref 3.8–5.2)
RBC # BLD: 2.93 M/UL — LOW (ref 3.8–5.2)
RBC # BLD: 2.93 M/UL — LOW (ref 3.8–5.2)
RBC # FLD: 17 % — HIGH (ref 10.3–14.5)
RBC # FLD: 17.1 % — HIGH (ref 10.3–14.5)
RBC BLD AUTO: ABNORMAL
RBC BLD AUTO: ABNORMAL
RETICS #: 200.4 K/UL — HIGH (ref 25–125)
RETICS/RBC NFR: 6.8 % — HIGH (ref 0.5–2.5)
SODIUM SERPL-SCNC: 139 MMOL/L — SIGNIFICANT CHANGE UP (ref 135–145)
SP GR SPEC: 1 — LOW (ref 1.01–1.02)
TARGETS BLD QL SMEAR: SLIGHT — SIGNIFICANT CHANGE UP
TARGETS BLD QL SMEAR: SLIGHT — SIGNIFICANT CHANGE UP
TROPONIN T SERPL-MCNC: <0.01 NG/ML — SIGNIFICANT CHANGE UP (ref 0–0.06)
UROBILINOGEN FLD QL: NEGATIVE MG/DL — SIGNIFICANT CHANGE UP
VARIANT LYMPHS # BLD: 1.7 % — SIGNIFICANT CHANGE UP (ref 0–6)
VARIANT LYMPHS # BLD: 1.9 % — SIGNIFICANT CHANGE UP (ref 0–6)
WBC # BLD: 3.54 K/UL — LOW (ref 3.8–10.5)
WBC # BLD: 4.99 K/UL — SIGNIFICANT CHANGE UP (ref 3.8–10.5)
WBC # FLD AUTO: 3.54 K/UL — LOW (ref 3.8–10.5)
WBC # FLD AUTO: 4.99 K/UL — SIGNIFICANT CHANGE UP (ref 3.8–10.5)

## 2021-08-13 PROCEDURE — 99220: CPT

## 2021-08-13 PROCEDURE — 84702 CHORIONIC GONADOTROPIN TEST: CPT

## 2021-08-13 PROCEDURE — 85025 COMPLETE CBC W/AUTO DIFF WBC: CPT

## 2021-08-13 PROCEDURE — 81003 URINALYSIS AUTO W/O SCOPE: CPT

## 2021-08-13 PROCEDURE — 96376 TX/PRO/DX INJ SAME DRUG ADON: CPT

## 2021-08-13 PROCEDURE — 36430 TRANSFUSION BLD/BLD COMPNT: CPT

## 2021-08-13 PROCEDURE — 86901 BLOOD TYPING SEROLOGIC RH(D): CPT

## 2021-08-13 PROCEDURE — 96375 TX/PRO/DX INJ NEW DRUG ADDON: CPT

## 2021-08-13 PROCEDURE — 80053 COMPREHEN METABOLIC PANEL: CPT

## 2021-08-13 PROCEDURE — 93005 ELECTROCARDIOGRAM TRACING: CPT

## 2021-08-13 PROCEDURE — 71045 X-RAY EXAM CHEST 1 VIEW: CPT | Mod: 26

## 2021-08-13 PROCEDURE — 86900 BLOOD TYPING SEROLOGIC ABO: CPT

## 2021-08-13 PROCEDURE — 84484 ASSAY OF TROPONIN QUANT: CPT

## 2021-08-13 PROCEDURE — 99284 EMERGENCY DEPT VISIT MOD MDM: CPT | Mod: 25

## 2021-08-13 PROCEDURE — 93010 ELECTROCARDIOGRAM REPORT: CPT

## 2021-08-13 PROCEDURE — 71045 X-RAY EXAM CHEST 1 VIEW: CPT

## 2021-08-13 PROCEDURE — 36415 COLL VENOUS BLD VENIPUNCTURE: CPT

## 2021-08-13 PROCEDURE — P9016: CPT

## 2021-08-13 PROCEDURE — 96361 HYDRATE IV INFUSION ADD-ON: CPT

## 2021-08-13 PROCEDURE — 86923 COMPATIBILITY TEST ELECTRIC: CPT

## 2021-08-13 PROCEDURE — 85045 AUTOMATED RETICULOCYTE COUNT: CPT

## 2021-08-13 PROCEDURE — 86850 RBC ANTIBODY SCREEN: CPT

## 2021-08-13 PROCEDURE — G0378: CPT

## 2021-08-13 PROCEDURE — 96374 THER/PROPH/DIAG INJ IV PUSH: CPT

## 2021-08-13 RX ORDER — MORPHINE SULFATE 50 MG/1
4 CAPSULE, EXTENDED RELEASE ORAL ONCE
Refills: 0 | Status: DISCONTINUED | OUTPATIENT
Start: 2021-08-13 | End: 2021-08-13

## 2021-08-13 RX ORDER — HYDROMORPHONE HYDROCHLORIDE 2 MG/ML
1 INJECTION INTRAMUSCULAR; INTRAVENOUS; SUBCUTANEOUS ONCE
Refills: 0 | Status: DISCONTINUED | OUTPATIENT
Start: 2021-08-13 | End: 2021-08-13

## 2021-08-13 RX ORDER — OXYCODONE AND ACETAMINOPHEN 5; 325 MG/1; MG/1
1 TABLET ORAL
Qty: 12 | Refills: 0
Start: 2021-08-13 | End: 2021-08-15

## 2021-08-13 RX ORDER — KETOROLAC TROMETHAMINE 30 MG/ML
30 SYRINGE (ML) INJECTION ONCE
Refills: 0 | Status: DISCONTINUED | OUTPATIENT
Start: 2021-08-13 | End: 2021-08-13

## 2021-08-13 RX ORDER — ONDANSETRON 8 MG/1
4 TABLET, FILM COATED ORAL ONCE
Refills: 0 | Status: COMPLETED | OUTPATIENT
Start: 2021-08-13 | End: 2021-08-13

## 2021-08-13 RX ORDER — SODIUM CHLORIDE 9 MG/ML
1000 INJECTION INTRAMUSCULAR; INTRAVENOUS; SUBCUTANEOUS ONCE
Refills: 0 | Status: COMPLETED | OUTPATIENT
Start: 2021-08-13 | End: 2021-08-13

## 2021-08-13 RX ORDER — ALPRAZOLAM 0.25 MG
1 TABLET ORAL ONCE
Refills: 0 | Status: DISCONTINUED | OUTPATIENT
Start: 2021-08-13 | End: 2021-08-13

## 2021-08-13 RX ADMIN — HYDROMORPHONE HYDROCHLORIDE 1 MILLIGRAM(S): 2 INJECTION INTRAMUSCULAR; INTRAVENOUS; SUBCUTANEOUS at 06:52

## 2021-08-13 RX ADMIN — Medication 30 MILLIGRAM(S): at 18:52

## 2021-08-13 RX ADMIN — Medication 30 MILLIGRAM(S): at 03:40

## 2021-08-13 RX ADMIN — SODIUM CHLORIDE 1000 MILLILITER(S): 9 INJECTION INTRAMUSCULAR; INTRAVENOUS; SUBCUTANEOUS at 04:26

## 2021-08-13 RX ADMIN — Medication 30 MILLIGRAM(S): at 03:51

## 2021-08-13 RX ADMIN — SODIUM CHLORIDE 1000 MILLILITER(S): 9 INJECTION INTRAMUSCULAR; INTRAVENOUS; SUBCUTANEOUS at 05:52

## 2021-08-13 RX ADMIN — ONDANSETRON 4 MILLIGRAM(S): 8 TABLET, FILM COATED ORAL at 04:26

## 2021-08-13 RX ADMIN — Medication 1 MILLIGRAM(S): at 11:48

## 2021-08-13 RX ADMIN — MORPHINE SULFATE 4 MILLIGRAM(S): 50 CAPSULE, EXTENDED RELEASE ORAL at 03:51

## 2021-08-13 RX ADMIN — HYDROMORPHONE HYDROCHLORIDE 1 MILLIGRAM(S): 2 INJECTION INTRAMUSCULAR; INTRAVENOUS; SUBCUTANEOUS at 05:54

## 2021-08-13 RX ADMIN — Medication 30 MILLIGRAM(S): at 18:30

## 2021-08-13 RX ADMIN — HYDROMORPHONE HYDROCHLORIDE 1 MILLIGRAM(S): 2 INJECTION INTRAMUSCULAR; INTRAVENOUS; SUBCUTANEOUS at 13:36

## 2021-08-13 RX ADMIN — MORPHINE SULFATE 4 MILLIGRAM(S): 50 CAPSULE, EXTENDED RELEASE ORAL at 03:40

## 2021-08-13 NOTE — ED CDU PROVIDER INITIAL DAY NOTE - OBJECTIVE STATEMENT
40 y/o F with hx of sickle cell anemia c/o generalized pain and weakness x 1 week, constant, 5/10.  Patient states that she's had 6 blood transfusions in the past.  Denies chest pain, shortness of breath.  Case discussed with patient's hematologist.

## 2021-08-13 NOTE — ED ADULT NURSE NOTE - CAS ELECT INFOMATION PROVIDED
DC instructions provided and reviewed with patient. Patient in understanding of all dc instructions. No further questions for the RN regarding DC. VSS./DC instructions

## 2021-08-13 NOTE — ED PROVIDER NOTE - PROGRESS NOTE DETAILS
JK - Patient reported headache and neck tension with IV morphine. 1 mg IV Dilaudid ordered. Pain now much improved, patient resting comfortably. JK - EKG and labs negative for acute chest syndrome. Patient symptoms significantly improved. Dane Swanson MD  Spoke with Dr. Zepeda and patient regarding plan of care. Shared decision making came to decision of rechecking labwork at noon, and deciding necessity of transfusion at that point.

## 2021-08-13 NOTE — ED CDU PROVIDER DISPOSITION NOTE - PATIENT PORTAL LINK FT
You can access the FollowMyHealth Patient Portal offered by [facility] by registering at the following website: [email or web address]. By joining [facility]’s FollowMyHealth portal, you will also be able to view your health information using other applications (apps) compatible with our system.

## 2021-08-13 NOTE — ED CDU PROVIDER DISPOSITION NOTE - ATTENDING CONTRIBUTION TO CARE
I, Harish Mercado, have personally performed a face to face diagnostic evaluation on this patient. I have reviewed the ACP note and agree with the history, exam and plan of care, except as noted.    40 yo M hx of sickle cell disease p/w pain. hb 6. cxr clear. pain controlled with morphine, Dilaudid, and toradol.  no signes of acute chest syndrome. patient received 1 U PRBC in OBS. heme-onc follow up.

## 2021-08-13 NOTE — ED PROVIDER NOTE - CLINICAL SUMMARY MEDICAL DECISION MAKING FREE TEXT BOX
Patient is a 40 yo female with PMHx sickle cell anemia presenting with chest pain, back pain, and right ankle pain. Patient given Toradol and IV morphine for pain crisis. EKG, troponin, CXR ordered to evaluate for Acute chest syndrome. CBC with reticulocyte count, CMP obtained to assess for aplastic crisis vs. splenic sequestration. Patient is a 40 yo female with PMHx sickle cell anemia presenting with chest pain, back pain, and right ankle pain. Patient given Toradol and IV morphine for pain crisis. IVF, Zofran for nausea. EKG, troponin, CXR ordered to evaluate for Acute chest syndrome. CBC with reticulocyte count, CMP obtained to assess for aplastic crisis vs. splenic sequestration.

## 2021-08-13 NOTE — CONSULT NOTE ADULT - ASSESSMENT
Imp:  Sickle Beta Thal; Hb dropped to 6.0n with hemolysis; hemodynamically stable  Rec:  transfused one unit PRBC  Discharged when stable  Will see in office this coming week

## 2021-08-13 NOTE — ED CDU PROVIDER DISPOSITION NOTE - CLINICAL COURSE
pt presents with sickle cell crisis, no acute chest syndrome - received 1 unit PRBCs in ED, plan discussed with Dr. Savage

## 2021-08-13 NOTE — ED PROVIDER NOTE - OBJECTIVE STATEMENT
Patient is a 40 yo female with PMHx sickle cell anemia presenting with chest pain, back pain, and right ankle pain. She endorses 9/10 intermittent nonexertional sharp chest pain that started yesterday as well as some mild SOB for the past few days. Patient also complaining of lower back pain, neck pain, bilateral knee pain, and right ankle pain that started yesterday. Patient was last in the ED in december for pain crisis. She denies fevers, chills, n/v, diarrhea, dysuria, hematuria, hematemesis, hematochezia, recent travel, sick contacts, dizziness, lightheadedness, syncope, trauma.

## 2021-08-13 NOTE — CONSULT NOTE ADULT - SUBJECTIVE AND OBJECTIVE BOX
HPI: Patient is a 41y Female seen on consultation for the evaluation and management of SSA.  Pt seen in ER at 9AM; note written at 7:30PM  Ms. Rod well known to me from outpt management and in pt admissions.  Has Sickle Beta thalassemia with infrequent admissions ususall for knee and back pain.  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  Presented to ER c/o excessive fatigue, dizziness, difficulty functioning.  also has pain in left knee.  Occasional chest pain, no palpitations or dyspnea.  Hb 7.1 with increased retic.  No bleeding reproted.     PAST MEDICAL & SURGICAL HISTORY:  Sickle cell beta Thal/splenomegaly    S/P cholecystectomy  age 21.    History of appendectomy  age 3.        REVIEW OF SYSTEMS      General:Extrene fatigue	    Skin/Breast:no rash  	  Ophthalmologic:no blurry vison or diplopia  	  ENMT:	no dysphagia or odynophagia    Respiratory and Thorax:no SOB or cough  	  Cardiovascular:	fleeting chest pain    Gastrointestinal:	no N.V/abd pain or bleeding    Genitourinary:	no dysuria or hematruia    Musculoskeletal:	knee  pain    Neurological:denied neuro deficits	    Hematology/Lymphatics:	no bleeding     	    Allergic/Immunologic:	    MEDICATIONS  (STANDING):    MEDICATIONS  (PRN):      Allergies    No Known Allergies    Intolerances    Dilaudid (Other)      SOCIAL HISTORY:    Smoking Status:denied  Alcohol:social  Marital Status:  Occupation:guidance counsellor    FAMILY HISTORY:  FH: hypertension  father      	    Vital Signs Last 24 Hrs  T(C): 37.2 (13 Aug 2021 17:50), Max: 37.5 (13 Aug 2021 14:29)  T(F): 98.9 (13 Aug 2021 17:50), Max: 99.5 (13 Aug 2021 14:29)  HR: 81 (13 Aug 2021 17:50) (74 - 90)  BP: 100/67 (13 Aug 2021 17:50) (100/67 - 128/87)  BP(mean): --  RR: 18 (13 Aug 2021 17:50) (18 - 18)  SpO2: 98% (13 Aug 2021 17:50) (98% - 99%)    PHYSICAL EXAM:      Constitutional:WDWN, awake, alert, breathkng comfortably RA    Eyes:mildly icteric      Neck:no adenopathy    Respiratory:Clear    Cardiovascular:RRR    Gastrointestinal:soft, non-tender, palpable spleen tip          Extremities:no C/C/E    Neurological:non focal      LABS:                        6.0    3.54  )-----------( 64       ( 13 Aug 2021 11:10 )             17.9         139  |  104  |  7.2<L>  ----------------------------<  98  3.9   |  23.0  |  0.37<L>    Ca    8.5<L>      13 Aug 2021 03:50    TPro  6.9  /  Alb  4.2  /  TBili  2.1<H>  /  DBili  x   /  AST  65<H>  /  ALT  32  /  AlkPhos  56        Urinalysis Basic - ( 13 Aug 2021 04:17 )    Color: Yellow / Appearance: Clear / S.005 / pH: x  Gluc: x / Ketone: Negative  / Bili: Negative / Urobili: Negative mg/dL   Blood: x / Protein: Negative mg/dL / Nitrite: Negative   Leuk Esterase: Negative / RBC: x / WBC x   Sq Epi: x / Non Sq Epi: x / Bacteria: x        RADIOLOGY & ADDITIONAL STUDIES:

## 2021-08-13 NOTE — ED ADULT NURSE REASSESSMENT NOTE - NS ED NURSE REASSESS COMMENT FT1
Pt is resting in stretcher comfortably at this time, no apparent distress noted at this time. Pt safety maintained. Pt denies any complaints at this time.
Assumed care of the patient at 1300. Verbal report received from [facility] ED. Patient transferred to observation unit CDU 6. Patient A&Ox4. Patient with persistent lower back pain, requesting Dilaudid iv, VAISHALI Hernández notified. Patient also +SOB. Commode at the bedside. Pending transfusion of 1 unit of PRBC. Patient in understanding of plan of care. Patient with no further questions for the RN. Resting in comfort. Call bell within reach and encouraged to use when assistance needed. Will continue to monitor.

## 2021-08-13 NOTE — ED ADULT NURSE NOTE - OBJECTIVE STATEMENT
Pt AAOX3, pt c/o chest pain, back pain, and right ankle pain. pt hx of sickle cell anemia, She endorses 9/10 intermittent nonexertional sharp chest pain that started yesterday as well as some mild SOB for the past few days. Patient also complaining of lower back pain, neck pain, bilateral knee pain, and right ankle pain that started yesterday. Patient was last in the ED in december for pain crisis.

## 2021-08-13 NOTE — ED CDU PROVIDER INITIAL DAY NOTE - NS ED ROS FT
General: no fever/chills  HEENT: no difficulty swallowing  Respiratory: no shortness of breath or cough  Cardiac: no chest pain or palpitations  Abdomen: no abdominal pain or vomiting  Neuro: no LOC, no seizures, no weaknesses  Skin: no lesions or rashes

## 2021-08-13 NOTE — ED PROVIDER NOTE - ATTENDING CONTRIBUTION TO CARE
40 yo F with hx of SS disease presents to ED c/o CP, back pain and R ankle pain which is her typical crisis pain.  No assoc fever or chills.  Pt only taking ASA and folic acid at home for pain.  Last ED visit 12/20.  On exam pt appears uncomfortable, afebrile, Cor Reg. Lungs clear b/l, abd soft, NT, Ext FROM, Neuro non-focal, Rx pain control, IVF, supp O2, check labs, hydrate and re-eval

## 2021-08-13 NOTE — ED ADULT TRIAGE NOTE - CHIEF COMPLAINT QUOTE
C/o back, neck, ankle pain. +Bilateral ear "popping". Pt states I have sickle cell and I am in pain. Hx of sickle cell anemia.

## 2021-08-13 NOTE — ED PROVIDER NOTE - PHYSICAL EXAMINATION
Gen: no acute distress  Head: normocephalic, atraumatic  Lung: CTAB, no respiratory distress, no wheezing, rales, rhonchi  CV: normal s1/s2, rrr,   Abd: soft, tender to palpation in LUQ, non-distended, no rebound, no guarding   MSK: No edema, no visible deformities. Pain with palpation and movement of bilateral knees, right ankle, neck, and lower back.   Neuro: No focal neurologic deficits  Skin: No rash   Psych: normal affect

## 2021-10-07 NOTE — PATIENT PROFILE ADULT - NSPROGENSOURCEINFO_GEN_A_NUR
HPI:  Patient is a 64 yo Rt handed male with PMH of  CLL on Venetoclax, MDS, melanoma, paroxysmal AFib (not on AC) presented as transfer from [place] on 9/16 for event capture. Patient was  initially at [facility] on 9/14 with confusion, aphasia and R sided weakness. Patient was stroke code and was given s/p tPA. On admission, patient had CTH, CTA, CT perfusion during code stroke protocol with no acute findings. MRI Brain was  performed at  9/16 with no ischemic findings. EEG performed 9/15 showed diffuse L hemispheric slowing. Pt remained aphasic with AMS. +RUE spasticity was noted on exam.     Patient was transferred to [facility] on 9/16 for Epilepsy Monitoring Unit admission to evaluate for subclinical seizures. Patient was seen by neurologist and cardiologist. Patient was noted to have episodes agitation with episodes of somnolence with out medication needed. AC was held per cardio since there was no definitive CVA noted but advised to c/w ASA and statin. LP was performed on 9/17. EEG overnight (9/16-9/17) did not show seizures however did show L sided slowing concerning for a possible structural etiology.  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 He was seen by SS for evaluation and Dysphagia 2 with honey thick liquid was recommended. He was also started on ABT for right elbow cellulitis/ septic bursitis. LP results- Glucose 73, Protein 55, WBC 3, Gram stain no growth to date, HSV neg, Cryptococcal neg; infectious w/u thus far negative; AIE panel pending f/u results. Patient also experience visual hallucination of which opthalmology wad consulted  - no evidence of retinal detachment.    Patient was evaluated by PM&R and therapy for functional deficits, gait/ADL impairments and acute rehabilitation was recommended. Patient was medically optimized for discharge to [facility] IRU on 10/1.     (01 Oct 2021 13:32)      Rehab course significant for left arm infected cyst which was drained by surgery and treated with antibiotics pending culture results.     All other medical co-morbidites were stable.    Pt tolerated course of inpatient pt/ot/slp rehab with significant functional improvements and met rehab goals prior to discharge.     Pt was medically cleared on 10/8/21 for discharge to Home      
patient

## 2021-11-29 PROBLEM — Z00.00 ENCOUNTER FOR PREVENTIVE HEALTH EXAMINATION: Status: ACTIVE | Noted: 2021-11-29

## 2021-12-13 ENCOUNTER — APPOINTMENT (OUTPATIENT)
Dept: UROLOGY | Facility: CLINIC | Age: 41
End: 2021-12-13
Payer: COMMERCIAL

## 2021-12-13 VITALS
HEIGHT: 63 IN | HEART RATE: 97 BPM | DIASTOLIC BLOOD PRESSURE: 99 MMHG | SYSTOLIC BLOOD PRESSURE: 157 MMHG | BODY MASS INDEX: 20.38 KG/M2 | WEIGHT: 115 LBS

## 2021-12-13 PROCEDURE — 99203 OFFICE O/P NEW LOW 30 MIN: CPT

## 2021-12-19 NOTE — HISTORY OF PRESENT ILLNESS
[FreeTextEntry1] : patient presents with right sided flank pain at times and also some lower back pain\par Has Hx of sickle cell disease and Thalassemia and has a very large spleen\par Has had a CT scan

## 2021-12-19 NOTE — PHYSICAL EXAM
[General Appearance - Well Developed] : well developed [General Appearance - Well Nourished] : well nourished [Normal Appearance] : normal appearance [Well Groomed] : well groomed [General Appearance - In No Acute Distress] : no acute distress [Edema] : no peripheral edema [Respiration, Rhythm And Depth] : normal respiratory rhythm and effort [Exaggerated Use Of Accessory Muscles For Inspiration] : no accessory muscle use [Abdomen Tenderness] : non-tender [Abdomen Soft] : soft [Costovertebral Angle Tenderness] : no ~M costovertebral angle tenderness [FreeTextEntry1] : no flank tenderness [Normal Station and Gait] : the gait and station were normal for the patient's age [Urinary Bladder Findings] : the bladder was normal on palpation [] : no rash [No Focal Deficits] : no focal deficits [Oriented To Time, Place, And Person] : oriented to person, place, and time [Affect] : the affect was normal [Mood] : the mood was normal [Not Anxious] : not anxious [No Palpable Adenopathy] : no palpable adenopathy

## 2021-12-19 NOTE — ASSESSMENT
[FreeTextEntry1] : Reviewed CT scan personally    Large spleen\par No stones no hydronephrosis left kidney slightly compressed by the large spleen\par but patient's pain on the right\par She was reassured\par No urologic intervention needed\par

## 2022-03-05 ENCOUNTER — TRANSCRIPTION ENCOUNTER (OUTPATIENT)
Age: 42
End: 2022-03-05

## 2022-05-19 ENCOUNTER — EMERGENCY (EMERGENCY)
Facility: HOSPITAL | Age: 42
LOS: 1 days | Discharge: HOME | End: 2022-05-19
Attending: EMERGENCY MEDICINE
Payer: COMMERCIAL

## 2022-05-19 VITALS
WEIGHT: 111.99 LBS | OXYGEN SATURATION: 99 % | SYSTOLIC BLOOD PRESSURE: 126 MMHG | RESPIRATION RATE: 18 BRPM | HEART RATE: 96 BPM | TEMPERATURE: 99 F | HEIGHT: 63 IN | DIASTOLIC BLOOD PRESSURE: 83 MMHG

## 2022-05-19 VITALS
SYSTOLIC BLOOD PRESSURE: 118 MMHG | HEART RATE: 86 BPM | DIASTOLIC BLOOD PRESSURE: 67 MMHG | RESPIRATION RATE: 17 BRPM | TEMPERATURE: 99 F | OXYGEN SATURATION: 97 %

## 2022-05-19 DIAGNOSIS — Z98.89 OTHER SPECIFIED POSTPROCEDURAL STATES: Chronic | ICD-10-CM

## 2022-05-19 DIAGNOSIS — Z90.49 ACQUIRED ABSENCE OF OTHER SPECIFIED PARTS OF DIGESTIVE TRACT: Chronic | ICD-10-CM

## 2022-05-19 LAB
ALBUMIN SERPL ELPH-MCNC: 4 G/DL — SIGNIFICANT CHANGE UP (ref 3.3–5.2)
ALP SERPL-CCNC: 53 U/L — SIGNIFICANT CHANGE UP (ref 40–120)
ALT FLD-CCNC: 11 U/L — SIGNIFICANT CHANGE UP
ANION GAP SERPL CALC-SCNC: 14 MMOL/L — SIGNIFICANT CHANGE UP (ref 5–17)
ANISOCYTOSIS BLD QL: SIGNIFICANT CHANGE UP
AST SERPL-CCNC: 36 U/L — HIGH
BASOPHILS # BLD AUTO: 0 K/UL — SIGNIFICANT CHANGE UP (ref 0–0.2)
BASOPHILS NFR BLD AUTO: 0 % — SIGNIFICANT CHANGE UP (ref 0–2)
BILIRUB SERPL-MCNC: 2.2 MG/DL — HIGH (ref 0.4–2)
BLD GP AB SCN SERPL QL: SIGNIFICANT CHANGE UP
BUN SERPL-MCNC: 11.8 MG/DL — SIGNIFICANT CHANGE UP (ref 8–20)
CALCIUM SERPL-MCNC: 8.2 MG/DL — LOW (ref 8.6–10.2)
CHLORIDE SERPL-SCNC: 104 MMOL/L — SIGNIFICANT CHANGE UP (ref 98–107)
CO2 SERPL-SCNC: 22 MMOL/L — SIGNIFICANT CHANGE UP (ref 22–29)
CREAT SERPL-MCNC: 0.38 MG/DL — LOW (ref 0.5–1.3)
DACRYOCYTES BLD QL SMEAR: SIGNIFICANT CHANGE UP
EGFR: 127 ML/MIN/1.73M2 — SIGNIFICANT CHANGE UP
ELLIPTOCYTES BLD QL SMEAR: SIGNIFICANT CHANGE UP
EOSINOPHIL # BLD AUTO: 0 K/UL — SIGNIFICANT CHANGE UP (ref 0–0.5)
EOSINOPHIL NFR BLD AUTO: 0 % — SIGNIFICANT CHANGE UP (ref 0–6)
GLUCOSE SERPL-MCNC: 109 MG/DL — HIGH (ref 70–99)
HCT VFR BLD CALC: 16.5 % — CRITICAL LOW (ref 34.5–45)
HGB BLD-MCNC: 5.2 G/DL — CRITICAL LOW (ref 11.5–15.5)
LYMPHOCYTES # BLD AUTO: 0.87 K/UL — LOW (ref 1–3.3)
LYMPHOCYTES # BLD AUTO: 21.8 % — SIGNIFICANT CHANGE UP (ref 13–44)
MANUAL SMEAR VERIFICATION: SIGNIFICANT CHANGE UP
MCHC RBC-ENTMCNC: 23.7 PG — LOW (ref 27–34)
MCHC RBC-ENTMCNC: 31.5 GM/DL — LOW (ref 32–36)
MCV RBC AUTO: 75.3 FL — LOW (ref 80–100)
MICROCYTES BLD QL: SIGNIFICANT CHANGE UP
MONOCYTES # BLD AUTO: 0.07 K/UL — SIGNIFICANT CHANGE UP (ref 0–0.9)
MONOCYTES NFR BLD AUTO: 1.7 % — LOW (ref 2–14)
MYELOCYTES NFR BLD: 1.7 % — HIGH (ref 0–0)
NEUTROPHILS # BLD AUTO: 2.98 K/UL — SIGNIFICANT CHANGE UP (ref 1.8–7.4)
NEUTROPHILS NFR BLD AUTO: 74.8 % — SIGNIFICANT CHANGE UP (ref 43–77)
OVALOCYTES BLD QL SMEAR: SIGNIFICANT CHANGE UP
PLAT MORPH BLD: NORMAL — SIGNIFICANT CHANGE UP
PLATELET # BLD AUTO: 75 K/UL — LOW (ref 150–400)
POIKILOCYTOSIS BLD QL AUTO: SIGNIFICANT CHANGE UP
POLYCHROMASIA BLD QL SMEAR: SLIGHT — SIGNIFICANT CHANGE UP
POTASSIUM SERPL-MCNC: 3.7 MMOL/L — SIGNIFICANT CHANGE UP (ref 3.5–5.3)
POTASSIUM SERPL-SCNC: 3.7 MMOL/L — SIGNIFICANT CHANGE UP (ref 3.5–5.3)
PROT SERPL-MCNC: 6.5 G/DL — LOW (ref 6.6–8.7)
RAPID RVP RESULT: SIGNIFICANT CHANGE UP
RBC # BLD: 2.19 M/UL — LOW (ref 3.8–5.2)
RBC # BLD: 2.19 M/UL — LOW (ref 3.8–5.2)
RBC # FLD: 21.2 % — HIGH (ref 10.3–14.5)
RBC BLD AUTO: ABNORMAL
RETICS #: 184.5 K/UL — HIGH (ref 25–125)
RETICS/RBC NFR: 8.5 % — HIGH (ref 0.5–2.5)
SARS-COV-2 RNA SPEC QL NAA+PROBE: SIGNIFICANT CHANGE UP
SCHISTOCYTES BLD QL AUTO: SLIGHT — SIGNIFICANT CHANGE UP
SMUDGE CELLS # BLD: PRESENT — SIGNIFICANT CHANGE UP
SODIUM SERPL-SCNC: 139 MMOL/L — SIGNIFICANT CHANGE UP (ref 135–145)
TARGETS BLD QL SMEAR: SLIGHT — SIGNIFICANT CHANGE UP
TROPONIN T SERPL-MCNC: <0.01 NG/ML — SIGNIFICANT CHANGE UP (ref 0–0.06)
WBC # BLD: 3.98 K/UL — SIGNIFICANT CHANGE UP (ref 3.8–10.5)
WBC # FLD AUTO: 3.98 K/UL — SIGNIFICANT CHANGE UP (ref 3.8–10.5)

## 2022-05-19 PROCEDURE — 86850 RBC ANTIBODY SCREEN: CPT

## 2022-05-19 PROCEDURE — 99285 EMERGENCY DEPT VISIT HI MDM: CPT | Mod: 25

## 2022-05-19 PROCEDURE — P9016: CPT

## 2022-05-19 PROCEDURE — 36430 TRANSFUSION BLD/BLD COMPNT: CPT

## 2022-05-19 PROCEDURE — 86923 COMPATIBILITY TEST ELECTRIC: CPT

## 2022-05-19 PROCEDURE — 86900 BLOOD TYPING SEROLOGIC ABO: CPT

## 2022-05-19 PROCEDURE — 96365 THER/PROPH/DIAG IV INF INIT: CPT

## 2022-05-19 PROCEDURE — 99236 HOSP IP/OBS SAME DATE HI 85: CPT

## 2022-05-19 PROCEDURE — 85045 AUTOMATED RETICULOCYTE COUNT: CPT

## 2022-05-19 PROCEDURE — 71045 X-RAY EXAM CHEST 1 VIEW: CPT

## 2022-05-19 PROCEDURE — 71045 X-RAY EXAM CHEST 1 VIEW: CPT | Mod: 26

## 2022-05-19 PROCEDURE — G0378: CPT

## 2022-05-19 PROCEDURE — 99284 EMERGENCY DEPT VISIT MOD MDM: CPT | Mod: 25

## 2022-05-19 PROCEDURE — 86901 BLOOD TYPING SEROLOGIC RH(D): CPT

## 2022-05-19 PROCEDURE — 93005 ELECTROCARDIOGRAM TRACING: CPT

## 2022-05-19 PROCEDURE — 36415 COLL VENOUS BLD VENIPUNCTURE: CPT

## 2022-05-19 PROCEDURE — 84484 ASSAY OF TROPONIN QUANT: CPT

## 2022-05-19 PROCEDURE — 0225U NFCT DS DNA&RNA 21 SARSCOV2: CPT

## 2022-05-19 PROCEDURE — 85025 COMPLETE CBC W/AUTO DIFF WBC: CPT

## 2022-05-19 PROCEDURE — 93010 ELECTROCARDIOGRAM REPORT: CPT

## 2022-05-19 PROCEDURE — 96375 TX/PRO/DX INJ NEW DRUG ADDON: CPT

## 2022-05-19 PROCEDURE — 96376 TX/PRO/DX INJ SAME DRUG ADON: CPT

## 2022-05-19 PROCEDURE — 80053 COMPREHEN METABOLIC PANEL: CPT

## 2022-05-19 RX ORDER — CEFTRIAXONE 500 MG/1
1000 INJECTION, POWDER, FOR SOLUTION INTRAMUSCULAR; INTRAVENOUS ONCE
Refills: 0 | Status: COMPLETED | OUTPATIENT
Start: 2022-05-19 | End: 2022-05-19

## 2022-05-19 RX ORDER — AZITHROMYCIN 500 MG/1
1 TABLET, FILM COATED ORAL
Qty: 4 | Refills: 0
Start: 2022-05-19 | End: 2022-05-22

## 2022-05-19 RX ORDER — ONDANSETRON 8 MG/1
4 TABLET, FILM COATED ORAL ONCE
Refills: 0 | Status: COMPLETED | OUTPATIENT
Start: 2022-05-19 | End: 2022-05-19

## 2022-05-19 RX ORDER — AZITHROMYCIN 500 MG/1
500 TABLET, FILM COATED ORAL ONCE
Refills: 0 | Status: COMPLETED | OUTPATIENT
Start: 2022-05-19 | End: 2022-05-19

## 2022-05-19 RX ORDER — ACETAMINOPHEN 500 MG
650 TABLET ORAL ONCE
Refills: 0 | Status: COMPLETED | OUTPATIENT
Start: 2022-05-19 | End: 2022-05-19

## 2022-05-19 RX ORDER — SODIUM CHLORIDE 9 MG/ML
1000 INJECTION INTRAMUSCULAR; INTRAVENOUS; SUBCUTANEOUS ONCE
Refills: 0 | Status: COMPLETED | OUTPATIENT
Start: 2022-05-19 | End: 2022-05-19

## 2022-05-19 RX ORDER — ALPRAZOLAM 0.25 MG
1 TABLET ORAL ONCE
Refills: 0 | Status: DISCONTINUED | OUTPATIENT
Start: 2022-05-19 | End: 2022-05-19

## 2022-05-19 RX ORDER — ALPRAZOLAM 0.25 MG
0.25 TABLET ORAL ONCE
Refills: 0 | Status: DISCONTINUED | OUTPATIENT
Start: 2022-05-19 | End: 2022-05-19

## 2022-05-19 RX ORDER — MORPHINE SULFATE 50 MG/1
2 CAPSULE, EXTENDED RELEASE ORAL ONCE
Refills: 0 | Status: DISCONTINUED | OUTPATIENT
Start: 2022-05-19 | End: 2022-05-19

## 2022-05-19 RX ORDER — CEFPODOXIME PROXETIL 100 MG
1 TABLET ORAL
Qty: 14 | Refills: 0
Start: 2022-05-19 | End: 2022-05-25

## 2022-05-19 RX ADMIN — CEFTRIAXONE 100 MILLIGRAM(S): 500 INJECTION, POWDER, FOR SOLUTION INTRAMUSCULAR; INTRAVENOUS at 15:02

## 2022-05-19 RX ADMIN — SODIUM CHLORIDE 1000 MILLILITER(S): 9 INJECTION INTRAMUSCULAR; INTRAVENOUS; SUBCUTANEOUS at 13:02

## 2022-05-19 RX ADMIN — MORPHINE SULFATE 2 MILLIGRAM(S): 50 CAPSULE, EXTENDED RELEASE ORAL at 17:21

## 2022-05-19 RX ADMIN — MORPHINE SULFATE 2 MILLIGRAM(S): 50 CAPSULE, EXTENDED RELEASE ORAL at 14:50

## 2022-05-19 RX ADMIN — CEFTRIAXONE 1000 MILLIGRAM(S): 500 INJECTION, POWDER, FOR SOLUTION INTRAMUSCULAR; INTRAVENOUS at 15:31

## 2022-05-19 RX ADMIN — Medication 0.25 MILLIGRAM(S): at 13:13

## 2022-05-19 RX ADMIN — Medication 650 MILLIGRAM(S): at 21:05

## 2022-05-19 RX ADMIN — ONDANSETRON 4 MILLIGRAM(S): 8 TABLET, FILM COATED ORAL at 21:15

## 2022-05-19 RX ADMIN — Medication 1 MILLIGRAM(S): at 21:15

## 2022-05-19 RX ADMIN — SODIUM CHLORIDE 1000 MILLILITER(S): 9 INJECTION INTRAMUSCULAR; INTRAVENOUS; SUBCUTANEOUS at 12:55

## 2022-05-19 RX ADMIN — MORPHINE SULFATE 2 MILLIGRAM(S): 50 CAPSULE, EXTENDED RELEASE ORAL at 15:30

## 2022-05-19 RX ADMIN — AZITHROMYCIN 255 MILLIGRAM(S): 500 TABLET, FILM COATED ORAL at 15:02

## 2022-05-19 RX ADMIN — MORPHINE SULFATE 2 MILLIGRAM(S): 50 CAPSULE, EXTENDED RELEASE ORAL at 18:20

## 2022-05-19 NOTE — ED PROVIDER NOTE - OBJECTIVE STATEMENT
Patient is a 43 yo female with PMHx sickle cell anemia, cholecystectomy, appendectomy presenting with weakness, SOB on exertion, and HA. Patient states for several days she has had SOB with exertion and generalized weakness as well as 5/10 bilateral posterior dull intermittent HA after exerting herself; patient follows with hematologist Dr. Savage and saw her outpatient yesterday found to have hgb 5.9 with plan for transfusion tomorrow. However patient states she feels short of breath after several steps and presented to the hospital; denies any CP, cardiac history, fever, cough, congestion, dysuria, diarrhea. Patient is unvaccinated. Denies fevers, chills, dizziness, lightheadedness, dysphagia, dysarthria, diplopia, photophobia, syncope, cough, congestion, CP, abdominal pain, neck pain, back pain, diarrhea, dysuria, hematuria, hematochezia, hematemesis, n/v, recent travel, sick contacts, leg swelling.

## 2022-05-19 NOTE — ED CDU PROVIDER INITIAL DAY NOTE - OBJECTIVE STATEMENT
Patient is a 41 yo female with PMHx sickle cell anemia, cholecystectomy, appendectomy presenting with weakness, SOB on exertion, and HA. Patient states for several days she has had SOB with exertion and generalized weakness as well as 5/10 bilateral posterior dull intermittent HA after exerting herself; patient follows with hematologist Dr. Savage and saw her outpatient yesterday found to have hgb 5.9 with plan for transfusion tomorrow. However patient states she feels short of breath after several steps and presented to the hospital; denies any CP, cardiac history, fever, cough, congestion, dysuria, diarrhea. Patient is unvaccinated. Denies fevers, chills, dizziness, lightheadedness, dysphagia, dysarthria, diplopia, photophobia, syncope, cough, congestion, CP, abdominal pain, neck pain, back pain, diarrhea, dysuria, hematuria, hematochezia, hematemesis, n/v, recent travel, sick contacts, leg swelling.

## 2022-05-19 NOTE — ED PROVIDER NOTE - PATIENT PORTAL LINK FT
You can access the FollowMyHealth Patient Portal offered by [facility] by registering at the following website: [email or web address]. By joining [employer]’s FollowMyHealth portal, you will also be able to view your health information using other applications (apps) compatible with our system.

## 2022-05-19 NOTE — ED CDU PROVIDER INITIAL DAY NOTE - NSTIMEPROVIDERCAREINITIATE_GEN_ER
October 10, 2017      Precious Reyes  [id number] [address] [id number]        Dear ,    We are writing to inform you of your test results.  She has normal kidney function.No signs of diabetes.Cholesterol is improved and doing well.  Recheck labs in one year.  Component      Latest Ref Rng & Units 10/10/2017   Sodium      133 - 144 mmol/L 143   Potassium      3.4 - 5.3 mmol/L 3.9   Chloride      94 - 109 mmol/L 107   Carbon Dioxide      20 - 32 mmol/L 28   Anion Gap      3 - 14 mmol/L 8   Glucose      70 - 99 mg/dL 94   Urea Nitrogen      7 - 30 mg/dL 17   Creatinine      0.52 - 1.04 mg/dL 0.92   GFR Estimate      >60 mL/min/1.7m2 60 (L)   GFR Estimate If Black      >60 mL/min/1.7m2 73   Calcium      8.5 - 10.1 mg/dL 9.2   Cholesterol      <200 mg/dL 195   Triglycerides      <150 mg/dL 164 (H)   HDL Cholesterol      >49 mg/dL 62   LDL Cholesterol Calculated      <100 mg/dL 100 (H)   Non HDL Cholesterol      <130 mg/dL 133 (H)     If you have any questions or concerns, please call the clinic at the number listed above.       Sincerely,        Chau Cox MD                  
19-May-2022 12:30

## 2022-05-19 NOTE — ED PROVIDER NOTE - NSFOLLOWUPINSTRUCTIONS_ED_ALL_ED_FT
Anemia    WHAT YOU NEED TO KNOW:    Anemia is a low number of red blood cells or a low amount of hemoglobin in your red blood cells. Hemoglobin is a protein that helps carry oxygen throughout your body. Red blood cells use iron to create hemoglobin. Anemia may develop if your body does not have enough iron. It may also develop if your body does not make enough red blood cells or they die faster than your body can make them.    DISCHARGE INSTRUCTIONS:    Call your local emergency number (911 in the [place]), or have someone call if:   •You lose consciousness.      •You have severe chest pain.      Seek care immediately if:   •You have dark or bloody bowel movements.          Call your doctor if:   •Your symptoms are worse, even after treatment.      •You have questions or concerns about your condition or care.      Medicines:   •Iron or folic acid supplements help increase your red blood cell and hemoglobin levels.       •Vitamin B12 injections may help boost your red blood cell level and decrease your symptoms. Ask your healthcare provider how to inject B12.      •Take your medicine as directed. Contact your healthcare provider if you think your medicine is not helping or if you have side effects. Tell him of her if you are allergic to any medicine. Keep a list of the medicines, vitamins, and herbs you take. Include the amounts, and when and why you take them. Bring the list or the pill bottles to follow-up visits. Carry your medicine list with you in case of an emergency.      Prevent anemia: Eat healthy foods rich in iron and vitamin C. Nuts, meat, dark leafy green vegetables, and beans are high in iron and protein. Vitamin C helps your body absorb iron. Foods rich in vitamin C include oranges and other citrus fruits. Ask your healthcare provider for a list of other foods that are high in iron or vitamin C. Ask if you need to be on a special diet.  Sources of Iron       Sources of Vitamin C         Follow up with your doctor as directed: Write down your questions so you remember to ask them during your visits.

## 2022-05-19 NOTE — ED PROVIDER NOTE - CLINICAL SUMMARY MEDICAL DECISION MAKING FREE TEXT BOX
Patient is a 43 yo female with PMHx sickle cell anemia, cholecystectomy, appendectomy presenting with weakness, SOB on exertion, and HA. Patient states for several days she has had SOB with exertion and generalized weakness as well as 5/10 bilateral posterior dull intermittent HA after exerting herself; patient follows with hematologist Dr. Savage and saw her outpatient yesterday found to have hgb 5.9 with plan for transfusion tomorrow. VSS, lungs CTAB, belly soft nontender, no FND, moving all extremities equally. CBC CMP to r/o blood loss anemia and electrolyte abnormalities; CXR RVP troponin to r/o PNA vs. acute chest. retic count to r/o aplastic crisis. ECG WNL. Will continue to monitor and likely transfuse.

## 2022-05-19 NOTE — ED CDU PROVIDER INITIAL DAY NOTE - MEDICAL DECISION MAKING DETAILS
sickle cell, for transfusion 2 units. will small infiltrate and no other symptoms, getting abx. pt not wanting any further w/u treatment other than transfusion.

## 2022-05-19 NOTE — ED ADULT TRIAGE NOTE - CHIEF COMPLAINT QUOTE
patient c/o Dyspneic on exertion, for the past couple of day, hx of thalassemia, anemia and sickle cell. is scheduled for transfusion at [facility] but can not take "it" any more. was also told that her hemoglobin was low at 5.9.

## 2022-05-19 NOTE — ED PROVIDER NOTE - ATTENDING CONTRIBUTION TO CARE
Fidencio: I performed a face to face evaluation of this patient and performed a full history and physical examination on the patient.  I agree with the resident's history, physical examination, and plan of the patient unless otherwise noted. My brief assessment is as follows: hx sickle cell/beta thal hgb baseline ~8 here for exertional dyspnea, and found to have hgb 5.9 with Dr. Oziel Zepeda hematologist on wednesday. States this is how she feels when needs transfusion, last transfused in january. was scheduled for transfusion tmrw but felt too sob today. also c/o some pain to neck/posterior head which pt frequently gets when hgb low per pt, declining pain meds. no f/c, no cp, no new abd pain (states years of some mild discomfort over enlarged spleen unchanged), no neuro symptoms. no orthopnea. lmp 2 weeks ago, denies pregnancy. non toxic, with some pallor, ctab, nl sats and speaking full sentences, abd benign, neuro intact. labs, retic count, likely transfusion.

## 2022-05-19 NOTE — ED PROVIDER NOTE - CARE PLAN
1 Principal Discharge DX:	Anemia   Principal Discharge DX:	Symptomatic anemia  Secondary Diagnosis:	Left pulmonary infiltrate on CXR

## 2022-05-19 NOTE — ED PROVIDER NOTE - PHYSICAL EXAMINATION
Constitutional: Well appearing, awake, alert, oriented to person, place, and time/situation and in no apparent distress  ENMT: Airway patent nasal mucosa clear. Mouth with dry oral mucosa. Throat has no vesicles no oropharyngeal exudates and uvula is midline. No blood in the oropharynx  EYES: clear bilaterally, pupils equal, round and reactive to light  Cardiac: Regular rate, regular rhythm. Heart sounds S1, S2. No murmurs, rubs or gallops. Good capillary refill, 2+ pulses, no peripheral edema  Respiratory: Lungs CTAB, no use of accessory muscles, no crackles, satting 99% on RA in no distress  Gastrointestinal: Abdomen nondistended, non-tender, no rebound guarding or peritoneal signs  Genitourinary: No CVA tenderness, pelvis nontender, bladder nondistended  Musculoskeletal: Spine appears normal, range of motion is not limited, no muscle or joint tenderness  Neurological: Alert and oriented, no focal deficits, no motor or sensory deficits. CN 2-12 intact, PERRLA, EOMI, No FND, moving all extremities equally, sensation intact, No dysmetria, no ataxia, negative heel-shin, 5/5 strength   Skin: Skin normal color for race, warm, dry and intact, No evidence of rash

## 2022-05-19 NOTE — ED PROVIDER NOTE - PROGRESS NOTE DETAILS
Fidencio: ?small infiltrate on cxr, no fever cough or elevated wbc, just exertional dyspnea consistent with hgb 5.2. pt declining ct for further evaulation. will treat empirically and tranfuse. JK - antibiotics given for L lung infiltrate. 2 units prbc given for hgb 5.2; patient VSS, resting comfortably in no apparent distress. As per hematologist Dr. Savage patient safe to DC and follow up with her outpatient as long as her symptoms improve; patient reports feeling significantly better, tolerating PO, ambulating on her own without CP/SOB/lightheadedness. Ready and agreeable to DC with return precautions and abx sent to pharmacy as well as close follow up with her hematologist.

## 2022-06-10 NOTE — ED CDU PROVIDER DISPOSITION NOTE - PATIENT PORTAL LINK FT
You can access the FollowMyHealth Patient Portal offered by [facility] by registering at the following website: [email or web address]. By joining [employer]’s FollowMyHealth portal, you will also be able to view your health information using other applications (apps) compatible with our system.

## 2022-11-09 ENCOUNTER — NON-APPOINTMENT (OUTPATIENT)
Age: 42
End: 2022-11-09

## 2023-07-01 ENCOUNTER — EMERGENCY (EMERGENCY)
Facility: HOSPITAL | Age: 43
LOS: 1 days | Discharge: HOME | End: 2023-07-01
Attending: EMERGENCY MEDICINE
Payer: COMMERCIAL

## 2023-07-01 VITALS
OXYGEN SATURATION: 97 % | RESPIRATION RATE: 18 BRPM | HEIGHT: 63 IN | TEMPERATURE: 98 F | HEART RATE: 102 BPM | DIASTOLIC BLOOD PRESSURE: 93 MMHG | SYSTOLIC BLOOD PRESSURE: 139 MMHG | WEIGHT: 111.99 LBS

## 2023-07-01 VITALS
OXYGEN SATURATION: 100 % | TEMPERATURE: 98 F | DIASTOLIC BLOOD PRESSURE: 78 MMHG | SYSTOLIC BLOOD PRESSURE: 115 MMHG | HEART RATE: 93 BPM | RESPIRATION RATE: 18 BRPM

## 2023-07-01 DIAGNOSIS — Z90.49 ACQUIRED ABSENCE OF OTHER SPECIFIED PARTS OF DIGESTIVE TRACT: Chronic | ICD-10-CM

## 2023-07-01 DIAGNOSIS — Z98.89 OTHER SPECIFIED POSTPROCEDURAL STATES: Chronic | ICD-10-CM

## 2023-07-01 LAB
ALBUMIN SERPL ELPH-MCNC: 4.5 G/DL — SIGNIFICANT CHANGE UP (ref 3.3–5.2)
ALP SERPL-CCNC: 43 U/L — SIGNIFICANT CHANGE UP (ref 40–120)
ALT FLD-CCNC: 22 U/L — SIGNIFICANT CHANGE UP
ANION GAP SERPL CALC-SCNC: 13 MMOL/L — SIGNIFICANT CHANGE UP (ref 5–17)
ANISOCYTOSIS BLD QL: SLIGHT — SIGNIFICANT CHANGE UP
AST SERPL-CCNC: 60 U/L — HIGH
BASOPHILS # BLD AUTO: 0.07 K/UL — SIGNIFICANT CHANGE UP (ref 0–0.2)
BASOPHILS NFR BLD AUTO: 0.9 % — SIGNIFICANT CHANGE UP (ref 0–2)
BILIRUB SERPL-MCNC: 3.8 MG/DL — HIGH (ref 0.4–2)
BUN SERPL-MCNC: 6.7 MG/DL — LOW (ref 8–20)
CALCIUM SERPL-MCNC: 9.2 MG/DL — SIGNIFICANT CHANGE UP (ref 8.4–10.5)
CHLORIDE SERPL-SCNC: 100 MMOL/L — SIGNIFICANT CHANGE UP (ref 96–108)
CO2 SERPL-SCNC: 23 MMOL/L — SIGNIFICANT CHANGE UP (ref 22–29)
CREAT SERPL-MCNC: 0.27 MG/DL — LOW (ref 0.5–1.3)
EGFR: 138 ML/MIN/1.73M2 — SIGNIFICANT CHANGE UP
ELLIPTOCYTES BLD QL SMEAR: SLIGHT — SIGNIFICANT CHANGE UP
EOSINOPHIL # BLD AUTO: 0.32 K/UL — SIGNIFICANT CHANGE UP (ref 0–0.5)
EOSINOPHIL NFR BLD AUTO: 4.2 % — SIGNIFICANT CHANGE UP (ref 0–6)
GIANT PLATELETS BLD QL SMEAR: PRESENT — SIGNIFICANT CHANGE UP
GLUCOSE SERPL-MCNC: 96 MG/DL — SIGNIFICANT CHANGE UP (ref 70–99)
HCG SERPL-ACNC: <4 MIU/ML — SIGNIFICANT CHANGE UP
HCT VFR BLD CALC: 21.5 % — LOW (ref 34.5–45)
HGB BLD-MCNC: 7.4 G/DL — LOW (ref 11.5–15.5)
HYPOCHROMIA BLD QL: SLIGHT — SIGNIFICANT CHANGE UP
LYMPHOCYTES # BLD AUTO: 1.21 K/UL — SIGNIFICANT CHANGE UP (ref 1–3.3)
LYMPHOCYTES # BLD AUTO: 16.1 % — SIGNIFICANT CHANGE UP (ref 13–44)
MACROCYTES BLD QL: SLIGHT — SIGNIFICANT CHANGE UP
MANUAL SMEAR VERIFICATION: SIGNIFICANT CHANGE UP
MCHC RBC-ENTMCNC: 25.7 PG — LOW (ref 27–34)
MCHC RBC-ENTMCNC: 34.4 GM/DL — SIGNIFICANT CHANGE UP (ref 32–36)
MCV RBC AUTO: 74.7 FL — LOW (ref 80–100)
METAMYELOCYTES # FLD: 0.8 % — HIGH (ref 0–0)
MONOCYTES # BLD AUTO: 0.64 K/UL — SIGNIFICANT CHANGE UP (ref 0–0.9)
MONOCYTES NFR BLD AUTO: 8.5 % — SIGNIFICANT CHANGE UP (ref 2–14)
MYELOCYTES NFR BLD: 3.4 % — HIGH (ref 0–0)
NEUTROPHILS # BLD AUTO: 4.77 K/UL — SIGNIFICANT CHANGE UP (ref 1.8–7.4)
NEUTROPHILS NFR BLD AUTO: 62.7 % — SIGNIFICANT CHANGE UP (ref 43–77)
NEUTS BAND # BLD: 0.9 % — SIGNIFICANT CHANGE UP (ref 0–8)
NRBC # BLD: 9 /100 — HIGH (ref 0–0)
OVALOCYTES BLD QL SMEAR: SLIGHT — SIGNIFICANT CHANGE UP
PLAT MORPH BLD: NORMAL — SIGNIFICANT CHANGE UP
PLATELET # BLD AUTO: 46 K/UL — LOW (ref 150–400)
POIKILOCYTOSIS BLD QL AUTO: SLIGHT — SIGNIFICANT CHANGE UP
POLYCHROMASIA BLD QL SMEAR: SIGNIFICANT CHANGE UP
POTASSIUM SERPL-MCNC: 3.4 MMOL/L — LOW (ref 3.5–5.3)
POTASSIUM SERPL-SCNC: 3.4 MMOL/L — LOW (ref 3.5–5.3)
PROMYELOCYTES # FLD: 1.7 % — HIGH (ref 0–0)
PROT SERPL-MCNC: 7.4 G/DL — SIGNIFICANT CHANGE UP (ref 6.6–8.7)
RAPID RVP RESULT: SIGNIFICANT CHANGE UP
RBC # BLD: 2.88 M/UL — LOW (ref 3.8–5.2)
RBC # FLD: 18.7 % — HIGH (ref 10.3–14.5)
RBC BLD AUTO: ABNORMAL
SARS-COV-2 RNA SPEC QL NAA+PROBE: SIGNIFICANT CHANGE UP
SCHISTOCYTES BLD QL AUTO: SLIGHT — SIGNIFICANT CHANGE UP
SODIUM SERPL-SCNC: 136 MMOL/L — SIGNIFICANT CHANGE UP (ref 135–145)
TARGETS BLD QL SMEAR: SLIGHT — SIGNIFICANT CHANGE UP
VARIANT LYMPHS # BLD: 0.8 % — SIGNIFICANT CHANGE UP (ref 0–6)
WBC # BLD: 7.5 K/UL — SIGNIFICANT CHANGE UP (ref 3.8–10.5)
WBC # FLD AUTO: 7.5 K/UL — SIGNIFICANT CHANGE UP (ref 3.8–10.5)

## 2023-07-01 PROCEDURE — 99284 EMERGENCY DEPT VISIT MOD MDM: CPT

## 2023-07-01 NOTE — ED PROVIDER NOTE - CLINICAL SUMMARY MEDICAL DECISION MAKING FREE TEXT BOX
Pt with previous sickle cell crisis and now experiencing throat pain, Most likely viral illness of cocci; pt's known hemoglobin was 7 at dc- plan to followup with hematologist and treat pain. Reassess. Pt with previous sickle cell crisis and now experiencing throat pain, Most likely viral illness/ coxsackie;  pt's known hemoglobin was 7 at dc- plan to followup with hematologist and treat pain. Reassess.    hgb 7.2 stable today platelets 46 notes that its at baseline - [facility] will OR with hematology follow up states that will see her doctor soraida 5 days

## 2023-07-01 NOTE — ED ADULT TRIAGE NOTE - CHIEF COMPLAINT QUOTE
pt states she is anemic just discharged from [facility] last night, states she had Sickle cell anemia, was hgb 4.8 now 7.0, woke up today with pus to back of throat\  A&Ox3, resp wnl, color jaundiced pt states she is anemic just discharged from [facility] last night, states she had Sickle cell anemia,  hgb was  4.8 now 7.0, woke up today with pus to back of throat\  A&Ox3, resp wnl, color jaundiced

## 2023-07-01 NOTE — ED PROVIDER NOTE - NS ED ROS FT
Denies f/c/n/v/cp/sob/palpitations/ cough/rash/headache/dizziness/abd.pain/d/c/dysuria/hematuria +sore throat, Pt able to swallow secretions and drink fluids.

## 2023-07-01 NOTE — ED ADULT NURSE NOTE - NSFALLUNIVINTERV_ED_ALL_ED
Bed/Stretcher in lowest position, wheels locked, appropriate side rails in place/Call bell, personal items and telephone in reach/Instruct patient to call for assistance before getting out of bed/chair/stretcher/Non-slip footwear applied when patient is off stretcher/Stow to call system/Physically safe environment - no spills, clutter or unnecessary equipment/Purposeful proactive rounding/Room/bathroom lighting operational, light cord in reach

## 2023-07-01 NOTE — ED ADULT NURSE NOTE - OBJECTIVE STATEMENT
pt stated that she is anemic with her norm Hgb 7.  pt stated that she was just discharged from [facility] last night and states hat she has Sickle cell anemia.  pt stated that her hgb was  4.8 when got o GSH and then was 7.0 after getting blood transfusion.  pt stated that woke up today with flame to the back of throat today.  A&Ox3, resp wnl.  pt is jaundiced in the ED.

## 2023-07-01 NOTE — ED PROVIDER NOTE - NSFOLLOWUPINSTRUCTIONS_ED_ALL_ED_FT
return to the ED if symptoms worsen ,fever/chills, nausea/vomiting, chest pain, shortness of breath , worsening throat pain  follow up with your Hematologist in the next few days.

## 2023-07-01 NOTE — ED ADULT TRIAGE NOTE - AS TEMP SITE
Pt had IVC filter placed last Wed. Due to  Hx of PE. Pt is having a masectomy on Tuesday due to Melenoma on right breast. Pt started having back pain and intermitted headache starting last HS  
oral

## 2023-07-01 NOTE — ED PROVIDER NOTE - PATIENT PORTAL LINK FT
You can access the FollowMyHealth Patient Portal offered by [facility] by registering at the following website: [email or web address]. By joining [employer]’s FollowMyHealth portal, you will also be able to view your health information using other applications (apps) compatible with our system.

## 2023-07-01 NOTE — ED ADULT NURSE NOTE - CHIEF COMPLAINT QUOTE
pt states she is anemic just discharged from [facility] last night, states she had Sickle cell anemia,  hgb was  4.8 now 7.0, woke up today with pus to back of throat\  A&Ox3, resp wnl, color jaundiced

## 2023-07-01 NOTE — ED PROVIDER NOTE - OBJECTIVE STATEMENT
42 y/o female with PMHx of sickle cell anemia presents to ED s/p discharge at [facility] for sickle cell crisis. Pt initially went to  and was found o have a hemoglobin initially of 7 and a sore throat; was given Keflex. Pt resulted in negative strep test at  after discharge at . Pt's hemoglobin went from 7 to 4.8, improved with discharge yesterday. Denies f/c/n/v/cp/sob/palpitations/ cough/rash/headache/dizziness/abd.pain/d/c/dysuria/hematuria 42 y/o female with PMHx of sickle cell anemia presents to ED s/p discharge at [facility] for sickle cell crisis. Pt initially went to  and was found o have a hemoglobin initially of 7 and a sore throat; was given Keflex. Pt resulted in negative strep test at . Pt's hemoglobin went from 7 to 4.8, improved after transfusion with discharge yesterday into 7s. Denies f/c/n/v/cp/sob/palpitations/ cough/rash/headache/dizziness/abd.pain/d/c/dysuria/hematuria. came today bc still felt a little sore throat and noticed some red bumps to the throat. denies rash anywhere else/

## 2023-07-01 NOTE — ED PROVIDER NOTE - PHYSICAL EXAMINATION
Head: atraumatic, normacephalic  Face: atraumatic, no crepitus no orbiral/maxillary/mandibular ttp  throat: uvula midline, small red spots to posterior pharynx.   eyes: perrla eomi  heart: rrr s1s2  lungs: ctab  abd: soft, nt nd +bs no rebound/guarding no cva ttp  skin: warm  LE: no swelling, no calf ttp  back: no midline cervical/thoracic/lumbar ttp

## 2023-07-02 PROCEDURE — 0225U NFCT DS DNA&RNA 21 SARSCOV2: CPT

## 2023-07-02 PROCEDURE — 36415 COLL VENOUS BLD VENIPUNCTURE: CPT

## 2023-07-02 PROCEDURE — 99283 EMERGENCY DEPT VISIT LOW MDM: CPT

## 2023-07-02 PROCEDURE — 85025 COMPLETE CBC W/AUTO DIFF WBC: CPT

## 2023-07-02 PROCEDURE — 84702 CHORIONIC GONADOTROPIN TEST: CPT

## 2023-07-02 PROCEDURE — 80053 COMPREHEN METABOLIC PANEL: CPT

## 2023-11-02 ENCOUNTER — NON-APPOINTMENT (OUTPATIENT)
Age: 43
End: 2023-11-02

## 2024-01-17 ENCOUNTER — APPOINTMENT (OUTPATIENT)
Age: 44
End: 2024-01-17
Payer: COMMERCIAL

## 2024-01-17 VITALS
HEART RATE: 71 BPM | SYSTOLIC BLOOD PRESSURE: 128 MMHG | BODY MASS INDEX: 19.84 KG/M2 | WEIGHT: 112 LBS | HEIGHT: 63 IN | DIASTOLIC BLOOD PRESSURE: 84 MMHG

## 2024-01-17 DIAGNOSIS — M17.12 UNILATERAL PRIMARY OSTEOARTHRITIS, LEFT KNEE: ICD-10-CM

## 2024-01-17 DIAGNOSIS — D57.1 SICKLE-CELL DISEASE W/OUT CRISIS: ICD-10-CM

## 2024-01-17 DIAGNOSIS — M25.552 PAIN IN RIGHT HIP: ICD-10-CM

## 2024-01-17 DIAGNOSIS — M17.11 UNILATERAL PRIMARY OSTEOARTHRITIS, RIGHT KNEE: ICD-10-CM

## 2024-01-17 DIAGNOSIS — M25.551 PAIN IN RIGHT HIP: ICD-10-CM

## 2024-01-17 PROCEDURE — 99204 OFFICE O/P NEW MOD 45 MIN: CPT

## 2024-01-17 PROCEDURE — 73562 X-RAY EXAM OF KNEE 3: CPT | Mod: 50

## 2024-01-17 NOTE — HISTORY OF PRESENT ILLNESS
[de-identified] : The patient is a 44-year-old female here for initial evaluation of bilateral knee pain.  The patient has history of sickle cell anemia and was told she has osteonecrosis of multiple joints.  She has some pain in both knees when she exercises and has been unable to do certain yoga positions due to the pain in her knees.  She has not had any prior injections or any prior surgery on the knees.

## 2024-01-17 NOTE — DISCUSSION/SUMMARY
[de-identified] : The patient is a 44-year-old female with history of sickle cell anemia here for bilateral knee pain and bilateral hip pain  The patient has minimal osteoarthritis. We discussed exhausting conservative management for now.  Her pain has limited her activity quite substantially.  She has had to give up all physical therapy and exercise.  She recently gave up yoga.  She request bilateral knee and hip MRI she is concerned about osteonecrosis.  MRIs were ordered.  I think conservative treatment is appropriate she will follow-up with us after the MRI

## 2024-01-17 NOTE — PHYSICAL EXAM
[] : Sensory: [Normal] : No costovertebral angle tenderness and no spinal tenderness [de-identified] : Bilateral knee exam mild medial joint line tenderness.  Range of motion 0/125.  No effusions no instability.  The patient has full range of motion of bilateral hips  GENERAL APPEARANCE:   Well nourished and hydrated, pleasant, alert, and oriented x 3.  Appears their stated age.   Respiratory: No wheeezing, breath sounds clear. No cyanosis, no us of accesory musculature  CARDIOVASCULAR:   No apparent abnormalities.  No lower leg edema.  No varicosities.  Pedal pulses are palpable. NEUROLOGIC:   Sensation is normal, no muscle weakness in the upper or lower extremities. DERMATOLOGIC:   No apparent skin lesions, moist, warm, no rash. SPINE:   Cervical spine appears normal and moves freely; thoracic spine appears normal and moves freely; lumbosacral spine appears normal and moves freely, normal, nontender. MUSCULOSKELETAL:   Hands, wrists, and elbows are normal and move freely, shoulders are normal and move freely.   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

## 2024-06-19 ENCOUNTER — NON-APPOINTMENT (OUTPATIENT)
Age: 44
End: 2024-06-19

## 2024-09-07 ENCOUNTER — EMERGENCY (EMERGENCY)
Facility: HOSPITAL | Age: 44
LOS: 1 days | Discharge: HOME | End: 2024-09-07
Attending: EMERGENCY MEDICINE
Payer: COMMERCIAL

## 2024-09-07 VITALS
HEART RATE: 71 BPM | OXYGEN SATURATION: 96 % | SYSTOLIC BLOOD PRESSURE: 129 MMHG | DIASTOLIC BLOOD PRESSURE: 85 MMHG | TEMPERATURE: 98 F | RESPIRATION RATE: 18 BRPM

## 2024-09-07 VITALS
TEMPERATURE: 98 F | HEART RATE: 104 BPM | WEIGHT: 110.23 LBS | RESPIRATION RATE: 18 BRPM | OXYGEN SATURATION: 98 % | SYSTOLIC BLOOD PRESSURE: 131 MMHG | DIASTOLIC BLOOD PRESSURE: 82 MMHG

## 2024-09-07 DIAGNOSIS — Z98.89 OTHER SPECIFIED POSTPROCEDURAL STATES: Chronic | ICD-10-CM

## 2024-09-07 DIAGNOSIS — Z90.49 ACQUIRED ABSENCE OF OTHER SPECIFIED PARTS OF DIGESTIVE TRACT: Chronic | ICD-10-CM

## 2024-09-07 LAB
ALBUMIN SERPL ELPH-MCNC: 4.3 G/DL — SIGNIFICANT CHANGE UP (ref 3.3–5.2)
ALP SERPL-CCNC: 43 U/L — SIGNIFICANT CHANGE UP (ref 40–120)
ALT FLD-CCNC: 19 U/L — SIGNIFICANT CHANGE UP
ANION GAP SERPL CALC-SCNC: 10 MMOL/L — SIGNIFICANT CHANGE UP (ref 5–17)
APTT BLD: 28.7 SEC — SIGNIFICANT CHANGE UP (ref 24.5–35.6)
AST SERPL-CCNC: 58 U/L — HIGH
BILIRUB SERPL-MCNC: 2.6 MG/DL — HIGH (ref 0.4–2)
BLD GP AB SCN SERPL QL: SIGNIFICANT CHANGE UP
BUN SERPL-MCNC: 8 MG/DL — SIGNIFICANT CHANGE UP (ref 8–20)
CALCIUM SERPL-MCNC: 9 MG/DL — SIGNIFICANT CHANGE UP (ref 8.4–10.5)
CHLORIDE SERPL-SCNC: 104 MMOL/L — SIGNIFICANT CHANGE UP (ref 96–108)
CO2 SERPL-SCNC: 24 MMOL/L — SIGNIFICANT CHANGE UP (ref 22–29)
CREAT SERPL-MCNC: 0.26 MG/DL — LOW (ref 0.5–1.3)
EGFR: 138 ML/MIN/1.73M2 — SIGNIFICANT CHANGE UP
EGFR: 138 ML/MIN/1.73M2 — SIGNIFICANT CHANGE UP
GLUCOSE SERPL-MCNC: 114 MG/DL — HIGH (ref 70–99)
HCT VFR BLD CALC: 19.2 % — CRITICAL LOW (ref 34.5–45)
HGB BLD-MCNC: 6.3 G/DL — CRITICAL LOW (ref 11.5–15.5)
INR BLD: 1.15 RATIO — SIGNIFICANT CHANGE UP (ref 0.85–1.18)
MCHC RBC-ENTMCNC: 24 PG — LOW (ref 27–34)
MCHC RBC-ENTMCNC: 32.8 GM/DL — SIGNIFICANT CHANGE UP (ref 32–36)
MCV RBC AUTO: 73 FL — LOW (ref 80–100)
NRBC # BLD: 1 /100 WBCS — HIGH (ref 0–0)
NRBC BLD-RTO: 1 /100 WBCS — HIGH (ref 0–0)
PLATELET # BLD AUTO: 69 K/UL — LOW (ref 150–400)
POTASSIUM SERPL-MCNC: 3.6 MMOL/L — SIGNIFICANT CHANGE UP (ref 3.5–5.3)
POTASSIUM SERPL-SCNC: 3.6 MMOL/L — SIGNIFICANT CHANGE UP (ref 3.5–5.3)
PROT SERPL-MCNC: 7.4 G/DL — SIGNIFICANT CHANGE UP (ref 6.6–8.7)
PROTHROM AB SERPL-ACNC: 12.7 SEC — SIGNIFICANT CHANGE UP (ref 9.5–13)
RBC # BLD: 2.61 M/UL — LOW (ref 3.8–5.2)
RBC # BLD: 2.63 M/UL — LOW (ref 3.8–5.2)
RBC # FLD: 20 % — HIGH (ref 10.3–14.5)
RETICS #: 249.5 K/UL — HIGH (ref 25–125)
RETICS/RBC NFR: 9.6 % — HIGH (ref 0.5–2.5)
SODIUM SERPL-SCNC: 138 MMOL/L — SIGNIFICANT CHANGE UP (ref 135–145)
WBC # BLD: 6.52 K/UL — SIGNIFICANT CHANGE UP (ref 3.8–10.5)
WBC # FLD AUTO: 6.52 K/UL — SIGNIFICANT CHANGE UP (ref 3.8–10.5)

## 2024-09-07 PROCEDURE — 71045 X-RAY EXAM CHEST 1 VIEW: CPT

## 2024-09-07 PROCEDURE — 99285 EMERGENCY DEPT VISIT HI MDM: CPT | Mod: 25

## 2024-09-07 PROCEDURE — 86850 RBC ANTIBODY SCREEN: CPT

## 2024-09-07 PROCEDURE — 85027 COMPLETE CBC AUTOMATED: CPT

## 2024-09-07 PROCEDURE — 76705 ECHO EXAM OF ABDOMEN: CPT | Mod: 26

## 2024-09-07 PROCEDURE — 85045 AUTOMATED RETICULOCYTE COUNT: CPT

## 2024-09-07 PROCEDURE — 71045 X-RAY EXAM CHEST 1 VIEW: CPT | Mod: 26

## 2024-09-07 PROCEDURE — 86900 BLOOD TYPING SEROLOGIC ABO: CPT

## 2024-09-07 PROCEDURE — 96374 THER/PROPH/DIAG INJ IV PUSH: CPT

## 2024-09-07 PROCEDURE — 85730 THROMBOPLASTIN TIME PARTIAL: CPT

## 2024-09-07 PROCEDURE — 86901 BLOOD TYPING SEROLOGIC RH(D): CPT

## 2024-09-07 PROCEDURE — 85610 PROTHROMBIN TIME: CPT

## 2024-09-07 PROCEDURE — 36415 COLL VENOUS BLD VENIPUNCTURE: CPT

## 2024-09-07 PROCEDURE — 99285 EMERGENCY DEPT VISIT HI MDM: CPT

## 2024-09-07 PROCEDURE — P9016: CPT

## 2024-09-07 PROCEDURE — 86923 COMPATIBILITY TEST ELECTRIC: CPT

## 2024-09-07 PROCEDURE — 76705 ECHO EXAM OF ABDOMEN: CPT

## 2024-09-07 PROCEDURE — 36430 TRANSFUSION BLD/BLD COMPNT: CPT

## 2024-09-07 PROCEDURE — 80053 COMPREHEN METABOLIC PANEL: CPT

## 2024-09-07 PROCEDURE — 96372 THER/PROPH/DIAG INJ SC/IM: CPT | Mod: XU

## 2024-09-07 PROCEDURE — 96375 TX/PRO/DX INJ NEW DRUG ADDON: CPT

## 2024-09-07 RX ORDER — ONDANSETRON HCL/PF 4 MG/2 ML
4 VIAL (ML) INJECTION ONCE
Refills: 0 | Status: COMPLETED | OUTPATIENT
Start: 2024-09-07 | End: 2024-09-07

## 2024-09-07 RX ORDER — KETOROLAC TROMETHAMINE 30 MG/ML
15 INJECTION, SOLUTION INTRAMUSCULAR; INTRAVENOUS ONCE
Refills: 0 | Status: DISCONTINUED | OUTPATIENT
Start: 2024-09-07 | End: 2024-09-07

## 2024-09-07 RX ORDER — ALPRAZOLAM 0.5 MG
0.5 TABLET, EXTENDED RELEASE 24 HR ORAL ONCE
Refills: 0 | Status: DISCONTINUED | OUTPATIENT
Start: 2024-09-07 | End: 2024-09-07

## 2024-09-07 RX ORDER — ACETAMINOPHEN 500 MG/5ML
975 LIQUID (ML) ORAL ONCE
Refills: 0 | Status: COMPLETED | OUTPATIENT
Start: 2024-09-07 | End: 2024-09-07

## 2024-09-07 RX ORDER — IBUPROFEN 200 MG
600 TABLET ORAL ONCE
Refills: 0 | Status: COMPLETED | OUTPATIENT
Start: 2024-09-07 | End: 2024-09-07

## 2024-09-07 RX ADMIN — Medication 975 MILLIGRAM(S): at 17:11

## 2024-09-07 RX ADMIN — Medication 4 MILLIGRAM(S): at 22:27

## 2024-09-07 RX ADMIN — Medication 2 MILLIGRAM(S): at 21:01

## 2024-09-07 RX ADMIN — KETOROLAC TROMETHAMINE 15 MILLIGRAM(S): 30 INJECTION, SOLUTION INTRAMUSCULAR; INTRAVENOUS at 23:20

## 2024-09-07 RX ADMIN — Medication 600 MILLIGRAM(S): at 18:13

## 2024-09-07 RX ADMIN — Medication 0.5 MILLIGRAM(S): at 17:11

## 2025-06-03 ENCOUNTER — APPOINTMENT (OUTPATIENT)
Age: 45
End: 2025-06-03
Payer: COMMERCIAL

## 2025-06-03 VITALS
BODY MASS INDEX: 19.49 KG/M2 | HEIGHT: 63 IN | WEIGHT: 110 LBS | DIASTOLIC BLOOD PRESSURE: 90 MMHG | HEART RATE: 76 BPM | SYSTOLIC BLOOD PRESSURE: 129 MMHG

## 2025-06-03 DIAGNOSIS — N95.1 MENOPAUSAL AND FEMALE CLIMACTERIC STATES: ICD-10-CM

## 2025-06-03 DIAGNOSIS — D57.02 HB-SS DISEASE WITH SPLENIC SEQUESTRATION: ICD-10-CM

## 2025-06-03 DIAGNOSIS — D56.1 BETA THALASSEMIA: ICD-10-CM

## 2025-06-03 DIAGNOSIS — Z83.2 FAMILY HISTORY OF DISEASES OF THE BLOOD AND BLOOD-FORMING ORGANS AND CERTAIN DISORDERS INVOLVING THE IMMUNE MECHANISM: ICD-10-CM

## 2025-06-03 DIAGNOSIS — Z86.2 PERSONAL HISTORY OF DISEASES OF THE BLOOD AND BLOOD-FORMING ORGANS AND CERTAIN DISORDERS INVOLVING THE IMMUNE MECHANISM: ICD-10-CM

## 2025-06-03 DIAGNOSIS — N89.8 OTHER SPECIFIED NONINFLAMMATORY DISORDERS OF VAGINA: ICD-10-CM

## 2025-06-03 PROCEDURE — 99203 OFFICE O/P NEW LOW 30 MIN: CPT

## 2025-06-09 ENCOUNTER — TRANSCRIPTION ENCOUNTER (OUTPATIENT)
Age: 45
End: 2025-06-09
